# Patient Record
Sex: FEMALE | URBAN - METROPOLITAN AREA
[De-identification: names, ages, dates, MRNs, and addresses within clinical notes are randomized per-mention and may not be internally consistent; named-entity substitution may affect disease eponyms.]

---

## 2017-01-12 ENCOUNTER — EMERGENCY (EMERGENCY)
Facility: HOSPITAL | Age: 62
LOS: 1 days | Discharge: HOME | End: 2017-01-12
Attending: EMERGENCY MEDICINE | Admitting: EMERGENCY MEDICINE
Payer: COMMERCIAL

## 2017-01-12 VITALS
HEART RATE: 71 BPM | SYSTOLIC BLOOD PRESSURE: 150 MMHG | OXYGEN SATURATION: 100 % | DIASTOLIC BLOOD PRESSURE: 74 MMHG | RESPIRATION RATE: 18 BRPM | TEMPERATURE: 98 F

## 2017-01-12 VITALS
RESPIRATION RATE: 18 BRPM | DIASTOLIC BLOOD PRESSURE: 77 MMHG | OXYGEN SATURATION: 99 % | TEMPERATURE: 98 F | HEART RATE: 75 BPM | SYSTOLIC BLOOD PRESSURE: 144 MMHG

## 2017-01-12 LAB
ALBUMIN SERPL ELPH-MCNC: 3.9 G/DL — SIGNIFICANT CHANGE UP (ref 3.3–5)
ALP SERPL-CCNC: 94 U/L — SIGNIFICANT CHANGE UP (ref 40–120)
ALT FLD-CCNC: 21 U/L — SIGNIFICANT CHANGE UP (ref 4–33)
APPEARANCE UR: CLEAR — SIGNIFICANT CHANGE UP
AST SERPL-CCNC: 20 U/L — SIGNIFICANT CHANGE UP (ref 4–32)
BASOPHILS # BLD AUTO: 0.02 K/UL — SIGNIFICANT CHANGE UP (ref 0–0.2)
BASOPHILS NFR BLD AUTO: 0.2 % — SIGNIFICANT CHANGE UP (ref 0–2)
BILIRUB SERPL-MCNC: 0.4 MG/DL — SIGNIFICANT CHANGE UP (ref 0.2–1.2)
BILIRUB UR-MCNC: NEGATIVE — SIGNIFICANT CHANGE UP
BLOOD UR QL VISUAL: NEGATIVE — SIGNIFICANT CHANGE UP
BUN SERPL-MCNC: 17 MG/DL — SIGNIFICANT CHANGE UP (ref 7–23)
CALCIUM SERPL-MCNC: 9.8 MG/DL — SIGNIFICANT CHANGE UP (ref 8.4–10.5)
CHLORIDE SERPL-SCNC: 92 MMOL/L — LOW (ref 98–107)
CO2 SERPL-SCNC: 28 MMOL/L — SIGNIFICANT CHANGE UP (ref 22–31)
COLOR SPEC: SIGNIFICANT CHANGE UP
CREAT SERPL-MCNC: 0.66 MG/DL — SIGNIFICANT CHANGE UP (ref 0.5–1.3)
EOSINOPHIL # BLD AUTO: 0.49 K/UL — SIGNIFICANT CHANGE UP (ref 0–0.5)
EOSINOPHIL NFR BLD AUTO: 4.8 % — SIGNIFICANT CHANGE UP (ref 0–6)
GLUCOSE SERPL-MCNC: 114 MG/DL — HIGH (ref 70–99)
GLUCOSE UR-MCNC: NEGATIVE — SIGNIFICANT CHANGE UP
HCT VFR BLD CALC: 33.9 % — LOW (ref 34.5–45)
HGB BLD-MCNC: 10.8 G/DL — LOW (ref 11.5–15.5)
IMM GRANULOCYTES NFR BLD AUTO: 0.2 % — SIGNIFICANT CHANGE UP (ref 0–1.5)
KETONES UR-MCNC: NEGATIVE — SIGNIFICANT CHANGE UP
LEUKOCYTE ESTERASE UR-ACNC: SIGNIFICANT CHANGE UP
LYMPHOCYTES # BLD AUTO: 4.51 K/UL — HIGH (ref 1–3.3)
LYMPHOCYTES # BLD AUTO: 44 % — SIGNIFICANT CHANGE UP (ref 13–44)
MCHC RBC-ENTMCNC: 25.9 PG — LOW (ref 27–34)
MCHC RBC-ENTMCNC: 31.9 % — LOW (ref 32–36)
MCV RBC AUTO: 81.3 FL — SIGNIFICANT CHANGE UP (ref 80–100)
MONOCYTES # BLD AUTO: 0.57 K/UL — SIGNIFICANT CHANGE UP (ref 0–0.9)
MONOCYTES NFR BLD AUTO: 5.6 % — SIGNIFICANT CHANGE UP (ref 2–14)
MUCOUS THREADS # UR AUTO: SIGNIFICANT CHANGE UP
NEUTROPHILS # BLD AUTO: 4.64 K/UL — SIGNIFICANT CHANGE UP (ref 1.8–7.4)
NEUTROPHILS NFR BLD AUTO: 45.2 % — SIGNIFICANT CHANGE UP (ref 43–77)
NITRITE UR-MCNC: NEGATIVE — SIGNIFICANT CHANGE UP
PH UR: 6.5 — SIGNIFICANT CHANGE UP (ref 4.6–8)
PLATELET # BLD AUTO: 372 K/UL — SIGNIFICANT CHANGE UP (ref 150–400)
PMV BLD: 8.9 FL — SIGNIFICANT CHANGE UP (ref 7–13)
POTASSIUM SERPL-MCNC: 3.8 MMOL/L — SIGNIFICANT CHANGE UP (ref 3.5–5.3)
POTASSIUM SERPL-SCNC: 3.8 MMOL/L — SIGNIFICANT CHANGE UP (ref 3.5–5.3)
PROT SERPL-MCNC: 8.6 G/DL — HIGH (ref 6–8.3)
PROT UR-MCNC: NEGATIVE — SIGNIFICANT CHANGE UP
RBC # BLD: 4.17 M/UL — SIGNIFICANT CHANGE UP (ref 3.8–5.2)
RBC # FLD: 13.3 % — SIGNIFICANT CHANGE UP (ref 10.3–14.5)
SODIUM SERPL-SCNC: 134 MMOL/L — LOW (ref 135–145)
SP GR SPEC: 1.01 — SIGNIFICANT CHANGE UP (ref 1–1.03)
SQUAMOUS # UR AUTO: SIGNIFICANT CHANGE UP
UROBILINOGEN FLD QL: NORMAL E.U. — SIGNIFICANT CHANGE UP (ref 0.1–0.2)
WBC # BLD: 10.25 K/UL — SIGNIFICANT CHANGE UP (ref 3.8–10.5)
WBC # FLD AUTO: 10.25 K/UL — SIGNIFICANT CHANGE UP (ref 3.8–10.5)
WBC UR QL: SIGNIFICANT CHANGE UP (ref 0–?)

## 2017-01-12 PROCEDURE — 99284 EMERGENCY DEPT VISIT MOD MDM: CPT | Mod: 25

## 2017-01-12 PROCEDURE — 74176 CT ABD & PELVIS W/O CONTRAST: CPT | Mod: 26,GC

## 2017-01-12 PROCEDURE — 93010 ELECTROCARDIOGRAM REPORT: CPT

## 2017-01-12 NOTE — ED PROVIDER NOTE - PROGRESS NOTE DETAILS
Patient reevaluated and feeling better, denies any pain at the moment. Labs and CT within normal, symptoms most likely due to muscular pain. Patient will use Tylenol at home as she is a Diabetic and tries to use the less amount of NSAIDs as possible. Will give copies of the studies and discharge home.

## 2017-01-12 NOTE — ED PROVIDER NOTE - NS ED MD SCRIBE ATTENDING SCRIBE SECTIONS
PHYSICAL EXAM/HISTORY OF PRESENT ILLNESS/PAST MEDICAL/SURGICAL/SOCIAL HISTORY/VITAL SIGNS( Pullset)/DISPOSITION/HIV/REVIEW OF SYSTEMS

## 2017-01-12 NOTE — ED ADULT TRIAGE NOTE - CHIEF COMPLAINT QUOTE
Pt states had a fall in october fell on the right side since then pain on and off but yesterday developed worse pain. Pain increases with movement.

## 2017-01-12 NOTE — ED PROVIDER NOTE - PMH
CAD (coronary artery disease)    DM (diabetes mellitus)    HTN (hypertension)    Stented coronary artery

## 2017-01-12 NOTE — ED ADULT NURSE NOTE - OBJECTIVE STATEMENT
pt states she fell around Halloween and hit her rt flank. states she has been having pain in same area since. states last night pain was "killing me" . pain at present 6/10   evaluated by JESÚS Jaramillo. ct scan done.   sl placed labs sent. denies dysuria, hematuria.

## 2017-01-12 NOTE — ED PROVIDER NOTE - DETAILS:
I've seen and examined the patient and dictated the chart to the scribe, I agree with the documentation.

## 2017-01-12 NOTE — ED PROVIDER NOTE - OBJECTIVE STATEMENT
61  y F with PMH of DM, CAD with 3 stents, HTN, presents to the ED with Rt flank pain x yesterday. Pt states had a fall in 10/2016 fell on the right side. Went to [facility], XR and urine negative. Pt had swelling rx'd analgesia and antiinflammatories. Since then pain has been intermittent but yesterday developed worse pain. Pain increases with movement and cough. Notes Rt flank pain radiating to abd. Positive urinary frequency but no other urinary sx. Notes some nausea and constipation. No CP, no vomiting. Took 2 Gas-X to no relief.

## 2017-01-12 NOTE — ED PROVIDER NOTE - NS ED ATTENDING STATEMENT MOD
I have personally performed a face to face diagnostic evaluation on this patient. I have reviewed the PA note and agree with the history, exam, and plan of care, except as noted. I personally performed the services described in the documentation, reviewed the documentation recorded by the scribe in my presence and it accurately and completely records my words and action.

## 2017-10-03 ENCOUNTER — EMERGENCY (EMERGENCY)
Facility: HOSPITAL | Age: 62
LOS: 1 days | Discharge: HOME | End: 2017-10-03
Attending: EMERGENCY MEDICINE | Admitting: EMERGENCY MEDICINE
Payer: COMMERCIAL

## 2017-10-03 VITALS
SYSTOLIC BLOOD PRESSURE: 129 MMHG | DIASTOLIC BLOOD PRESSURE: 68 MMHG | RESPIRATION RATE: 18 BRPM | HEART RATE: 72 BPM | TEMPERATURE: 98 F | OXYGEN SATURATION: 100 %

## 2017-10-03 PROCEDURE — 99284 EMERGENCY DEPT VISIT MOD MDM: CPT

## 2017-10-03 PROCEDURE — 93971 EXTREMITY STUDY: CPT | Mod: 26,LT

## 2017-10-03 PROCEDURE — 73564 X-RAY EXAM KNEE 4 OR MORE: CPT | Mod: 26,RT

## 2017-10-03 RX ORDER — IBUPROFEN 200 MG
600 TABLET ORAL ONCE
Qty: 0 | Refills: 0 | Status: COMPLETED | OUTPATIENT
Start: 2017-10-03 | End: 2017-10-03

## 2017-10-03 RX ADMIN — Medication 600 MILLIGRAM(S): at 23:55

## 2017-10-03 NOTE — ED PROVIDER NOTE - MEDICAL DECISION MAKING DETAILS
62yF w/ pmhx HTN, DM, CAD s/p 3 stents presenting with right knee pain worse the past 3-4 days. Will get xray to r/o fracture and assess possible arthritic changes. Infectious process less likely given no swelling, erythema, or effusion

## 2017-10-03 NOTE — ED PROVIDER NOTE - ATTENDING CONTRIBUTION TO CARE
ED Attending Dr. Cabral: 61 yo female with CAD s/p stents, DM, HTN in ED with 3 days of atraumatic right knee pain.  Pt admits to somewhat chronic pain in the right knee but worse over the past 3 days, making walking difficult.  Pain worse to posterior knee.  No fever, CP/SOB, N/V/D or abdominal pain.  On exam pt well appearing, in NAD, heart RRR, lungs CTAB, abd NTND, strength 5/5 in all extremities and skin without rash.  Right knee mild generalized swelling anteriorly but no skin changes or erythema/cellulitis.  Nontender anterior right knee but posterior knee TTP, no bulging noted.  ROM right knee limited due to pain.

## 2017-10-03 NOTE — ED PROVIDER NOTE - CARE PLAN
Instructions for follow-up, activity and diet:	Follow up with your primary care provider within 48 hours  Follow up with an orthopedic doctor within 1 week  ACE wrap as needed for compression and support  Return to the emergency department with any concerning or worsening symptoms, increased pain or swelling, new onset fevers, weakness or any other concerning symptoms. Principal Discharge DX:	Knee pain  Instructions for follow-up, activity and diet:	Follow up with your primary care provider within 48 hours  Follow up with an orthopedic doctor within 1 week, referral list provided  ACE wrap as needed for compression and support  Use cane as needed for assistance with walking  Return to the emergency department with any concerning or worsening symptoms, increased pain or swelling, new onset fevers, weakness or any other concerning symptoms.

## 2017-10-03 NOTE — ED PROVIDER NOTE - PLAN OF CARE
Follow up with your primary care provider within 48 hours  Follow up with an orthopedic doctor within 1 week  ACE wrap as needed for compression and support  Return to the emergency department with any concerning or worsening symptoms, increased pain or swelling, new onset fevers, weakness or any other concerning symptoms. Follow up with your primary care provider within 48 hours  Follow up with an orthopedic doctor within 1 week, referral list provided  ACE wrap as needed for compression and support  Use cane as needed for assistance with walking  Return to the emergency department with any concerning or worsening symptoms, increased pain or swelling, new onset fevers, weakness or any other concerning symptoms.

## 2017-10-03 NOTE — ED ADULT TRIAGE NOTE - CHIEF COMPLAINT QUOTE
c/o rt knee pain x 4 days.  denies trauma.  denies fevers.   h/o DM, HTN, cardiac stents x 3  denies chest pain.

## 2017-10-03 NOTE — ED PROVIDER NOTE - OBJECTIVE STATEMENT
62yF w/ pmhx DM, HTN and CAD s/p 3 stents presenting with right knee pain x 3 days. Pt states she has had pain in her right knee since a fall years ago down stairs. Her pain is worsening the past 3 days, described as a stiffness, limited ROM and inability to bear weight today. Denies fevers/chills, 62yF w/ pmhx DM, HTN and CAD s/p 3 stents presenting with right knee pain x 3 days, denies trauma or injury. Pt states she has had pain in her right knee since a fall years ago down stairs. Her pain is worsening the past 3 days, described as a stiffness, pt feels her knee cracks when she walks, limited ROM and inability to bear weight today. Pt has been taking Tylenol at home for the pain with minimal relief. Two weeks ago patient received the flu shot and felt feverish for a few days after. Pt denies recent fever/chills, abdominal pain, dysuria, chest pain, shortness of breath or any other complaints

## 2017-10-03 NOTE — ED PROVIDER NOTE - PHYSICAL EXAMINATION
MSK: Right knee with ROM limited 2/2 pain, mildly tender to palpation in posterior knee and along the medial joint line, no signs of effusion, unable to fully assess knee given pain, all extremities neurovascularly intact

## 2017-10-04 PROBLEM — I25.10 ATHEROSCLEROTIC HEART DISEASE OF NATIVE CORONARY ARTERY WITHOUT ANGINA PECTORIS: Chronic | Status: ACTIVE | Noted: 2017-01-12

## 2017-10-04 PROBLEM — I10 ESSENTIAL (PRIMARY) HYPERTENSION: Chronic | Status: ACTIVE | Noted: 2017-01-12

## 2017-10-04 PROBLEM — Z95.5 PRESENCE OF CORONARY ANGIOPLASTY IMPLANT AND GRAFT: Chronic | Status: ACTIVE | Noted: 2017-01-12

## 2017-10-04 PROBLEM — E11.9 TYPE 2 DIABETES MELLITUS WITHOUT COMPLICATIONS: Chronic | Status: ACTIVE | Noted: 2017-01-12

## 2017-10-10 ENCOUNTER — INPATIENT (INPATIENT)
Facility: HOSPITAL | Age: 62
LOS: 1 days | Discharge: HOME | End: 2017-10-12
Attending: HOSPITALIST | Admitting: HOSPITALIST
Payer: COMMERCIAL

## 2017-10-10 VITALS
HEART RATE: 58 BPM | TEMPERATURE: 98 F | SYSTOLIC BLOOD PRESSURE: 170 MMHG | RESPIRATION RATE: 18 BRPM | DIASTOLIC BLOOD PRESSURE: 79 MMHG

## 2017-10-10 DIAGNOSIS — M79.606 PAIN IN LEG, UNSPECIFIED: ICD-10-CM

## 2017-10-10 LAB
ALBUMIN SERPL ELPH-MCNC: 3.8 G/DL — SIGNIFICANT CHANGE UP (ref 3.3–5)
ALP SERPL-CCNC: 84 U/L — SIGNIFICANT CHANGE UP (ref 40–120)
ALT FLD-CCNC: 23 U/L — SIGNIFICANT CHANGE UP (ref 4–33)
APPEARANCE UR: CLEAR — SIGNIFICANT CHANGE UP
AST SERPL-CCNC: 38 U/L — HIGH (ref 4–32)
BACTERIA # UR AUTO: SIGNIFICANT CHANGE UP
BASE EXCESS BLDV CALC-SCNC: 7 MMOL/L — SIGNIFICANT CHANGE UP
BASOPHILS # BLD AUTO: 0.03 K/UL — SIGNIFICANT CHANGE UP (ref 0–0.2)
BASOPHILS NFR BLD AUTO: 0.4 % — SIGNIFICANT CHANGE UP (ref 0–2)
BILIRUB SERPL-MCNC: 0.3 MG/DL — SIGNIFICANT CHANGE UP (ref 0.2–1.2)
BILIRUB UR-MCNC: NEGATIVE — SIGNIFICANT CHANGE UP
BLOOD GAS VENOUS - CREATININE: 0.55 MG/DL — SIGNIFICANT CHANGE UP (ref 0.5–1.3)
BLOOD UR QL VISUAL: NEGATIVE — SIGNIFICANT CHANGE UP
BUN SERPL-MCNC: 14 MG/DL — SIGNIFICANT CHANGE UP (ref 7–23)
CALCIUM SERPL-MCNC: 9.4 MG/DL — SIGNIFICANT CHANGE UP (ref 8.4–10.5)
CHLORIDE BLDV-SCNC: 100 MMOL/L — SIGNIFICANT CHANGE UP (ref 96–108)
CHLORIDE SERPL-SCNC: 94 MMOL/L — LOW (ref 98–107)
CO2 SERPL-SCNC: 26 MMOL/L — SIGNIFICANT CHANGE UP (ref 22–31)
COLOR SPEC: SIGNIFICANT CHANGE UP
CREAT SERPL-MCNC: 0.63 MG/DL — SIGNIFICANT CHANGE UP (ref 0.5–1.3)
EOSINOPHIL # BLD AUTO: 0.3 K/UL — SIGNIFICANT CHANGE UP (ref 0–0.5)
EOSINOPHIL NFR BLD AUTO: 3.9 % — SIGNIFICANT CHANGE UP (ref 0–6)
GAS PNL BLDV: 134 MMOL/L — LOW (ref 136–146)
GLUCOSE BLDV-MCNC: 103 — HIGH (ref 70–99)
GLUCOSE SERPL-MCNC: 98 MG/DL — SIGNIFICANT CHANGE UP (ref 70–99)
GLUCOSE UR-MCNC: NEGATIVE — SIGNIFICANT CHANGE UP
HCO3 BLDV-SCNC: 29 MMOL/L — HIGH (ref 20–27)
HCT VFR BLD CALC: 33.3 % — LOW (ref 34.5–45)
HCT VFR BLDV CALC: 33.7 % — LOW (ref 34.5–45)
HGB BLD-MCNC: 10.6 G/DL — LOW (ref 11.5–15.5)
HGB BLDV-MCNC: 10.9 G/DL — LOW (ref 11.5–15.5)
IMM GRANULOCYTES # BLD AUTO: 0.01 # — SIGNIFICANT CHANGE UP
IMM GRANULOCYTES NFR BLD AUTO: 0.1 % — SIGNIFICANT CHANGE UP (ref 0–1.5)
KETONES UR-MCNC: NEGATIVE — SIGNIFICANT CHANGE UP
LACTATE BLDV-MCNC: 0.9 MMOL/L — SIGNIFICANT CHANGE UP (ref 0.5–2)
LEUKOCYTE ESTERASE UR-ACNC: NEGATIVE — SIGNIFICANT CHANGE UP
LIDOCAIN IGE QN: 32.7 U/L — SIGNIFICANT CHANGE UP (ref 7–60)
LYMPHOCYTES # BLD AUTO: 3.19 K/UL — SIGNIFICANT CHANGE UP (ref 1–3.3)
LYMPHOCYTES # BLD AUTO: 41.4 % — SIGNIFICANT CHANGE UP (ref 13–44)
MCHC RBC-ENTMCNC: 25.6 PG — LOW (ref 27–34)
MCHC RBC-ENTMCNC: 31.8 % — LOW (ref 32–36)
MCV RBC AUTO: 80.4 FL — SIGNIFICANT CHANGE UP (ref 80–100)
MONOCYTES # BLD AUTO: 0.73 K/UL — SIGNIFICANT CHANGE UP (ref 0–0.9)
MONOCYTES NFR BLD AUTO: 9.5 % — SIGNIFICANT CHANGE UP (ref 2–14)
NEUTROPHILS # BLD AUTO: 3.44 K/UL — SIGNIFICANT CHANGE UP (ref 1.8–7.4)
NEUTROPHILS NFR BLD AUTO: 44.7 % — SIGNIFICANT CHANGE UP (ref 43–77)
NITRITE UR-MCNC: NEGATIVE — SIGNIFICANT CHANGE UP
NRBC # FLD: 0 — SIGNIFICANT CHANGE UP
OB PNL STL: NEGATIVE — SIGNIFICANT CHANGE UP
PCO2 BLDV: 56 MMHG — HIGH (ref 41–51)
PH BLDV: 7.38 PH — SIGNIFICANT CHANGE UP (ref 7.32–7.43)
PH UR: 7 — SIGNIFICANT CHANGE UP (ref 4.6–8)
PLATELET # BLD AUTO: 395 K/UL — SIGNIFICANT CHANGE UP (ref 150–400)
PMV BLD: 8.9 FL — SIGNIFICANT CHANGE UP (ref 7–13)
PO2 BLDV: 30 MMHG — LOW (ref 35–40)
POTASSIUM BLDV-SCNC: 3.1 MMOL/L — LOW (ref 3.4–4.5)
POTASSIUM SERPL-MCNC: 4.4 MMOL/L — SIGNIFICANT CHANGE UP (ref 3.5–5.3)
POTASSIUM SERPL-SCNC: 4.4 MMOL/L — SIGNIFICANT CHANGE UP (ref 3.5–5.3)
PROT SERPL-MCNC: 8.7 G/DL — HIGH (ref 6–8.3)
PROT UR-MCNC: NEGATIVE — SIGNIFICANT CHANGE UP
RBC # BLD: 4.14 M/UL — SIGNIFICANT CHANGE UP (ref 3.8–5.2)
RBC # FLD: 13.8 % — SIGNIFICANT CHANGE UP (ref 10.3–14.5)
RBC CASTS # UR COMP ASSIST: SIGNIFICANT CHANGE UP (ref 0–?)
SAO2 % BLDV: 49 % — LOW (ref 60–85)
SODIUM SERPL-SCNC: 137 MMOL/L — SIGNIFICANT CHANGE UP (ref 135–145)
SP GR SPEC: 1.01 — SIGNIFICANT CHANGE UP (ref 1–1.03)
SQUAMOUS # UR AUTO: SIGNIFICANT CHANGE UP
UROBILINOGEN FLD QL: NORMAL E.U. — SIGNIFICANT CHANGE UP (ref 0.1–0.2)
WBC # BLD: 7.7 K/UL — SIGNIFICANT CHANGE UP (ref 3.8–10.5)
WBC # FLD AUTO: 7.7 K/UL — SIGNIFICANT CHANGE UP (ref 3.8–10.5)
WBC UR QL: SIGNIFICANT CHANGE UP (ref 0–?)

## 2017-10-10 PROCEDURE — 74177 CT ABD & PELVIS W/CONTRAST: CPT | Mod: 26

## 2017-10-10 RX ORDER — ASPIRIN/CALCIUM CARB/MAGNESIUM 324 MG
1 TABLET ORAL
Qty: 0 | Refills: 0 | COMMUNITY

## 2017-10-10 RX ORDER — OXYCODONE AND ACETAMINOPHEN 5; 325 MG/1; MG/1
1 TABLET ORAL ONCE
Qty: 0 | Refills: 0 | Status: DISCONTINUED | OUTPATIENT
Start: 2017-10-10 | End: 2017-10-10

## 2017-10-10 RX ORDER — ATENOLOL 25 MG/1
1 TABLET ORAL
Qty: 0 | Refills: 0 | COMMUNITY

## 2017-10-10 RX ORDER — SODIUM CHLORIDE 9 MG/ML
1000 INJECTION INTRAMUSCULAR; INTRAVENOUS; SUBCUTANEOUS ONCE
Qty: 0 | Refills: 0 | Status: COMPLETED | OUTPATIENT
Start: 2017-10-10 | End: 2017-10-10

## 2017-10-10 RX ORDER — METFORMIN HYDROCHLORIDE 850 MG/1
1 TABLET ORAL
Qty: 0 | Refills: 0 | COMMUNITY

## 2017-10-10 RX ORDER — IBUPROFEN 200 MG
600 TABLET ORAL ONCE
Qty: 0 | Refills: 0 | Status: COMPLETED | OUTPATIENT
Start: 2017-10-10 | End: 2017-10-10

## 2017-10-10 RX ORDER — AMLODIPINE BESYLATE 2.5 MG/1
1 TABLET ORAL
Qty: 0 | Refills: 0 | COMMUNITY

## 2017-10-10 RX ORDER — LOSARTAN/HYDROCHLOROTHIAZIDE 100MG-25MG
1 TABLET ORAL
Qty: 0 | Refills: 0 | COMMUNITY

## 2017-10-10 RX ORDER — ROSUVASTATIN CALCIUM 5 MG/1
1 TABLET ORAL
Qty: 0 | Refills: 0 | COMMUNITY

## 2017-10-10 RX ORDER — CLOPIDOGREL BISULFATE 75 MG/1
1 TABLET, FILM COATED ORAL
Qty: 0 | Refills: 0 | COMMUNITY

## 2017-10-10 RX ORDER — GLIMEPIRIDE 1 MG
1 TABLET ORAL
Qty: 0 | Refills: 0 | COMMUNITY

## 2017-10-10 RX ADMIN — OXYCODONE AND ACETAMINOPHEN 1 TABLET(S): 5; 325 TABLET ORAL at 21:34

## 2017-10-10 RX ADMIN — Medication 600 MILLIGRAM(S): at 14:35

## 2017-10-10 RX ADMIN — SODIUM CHLORIDE 1000 MILLILITER(S): 9 INJECTION INTRAMUSCULAR; INTRAVENOUS; SUBCUTANEOUS at 14:14

## 2017-10-10 RX ADMIN — OXYCODONE AND ACETAMINOPHEN 1 TABLET(S): 5; 325 TABLET ORAL at 22:26

## 2017-10-10 RX ADMIN — Medication 600 MILLIGRAM(S): at 21:34

## 2017-10-10 NOTE — ED ADULT TRIAGE NOTE - CHIEF COMPLAINT QUOTE
Pt had 3 stints placed in January and rt groin area was incision area--pt c/o swelling in groin and leg-pt cant put pressure on leg--pt seen here on tuesday night and had sonogram--nothing found--  pt cant take pain

## 2017-10-10 NOTE — ED ADULT NURSE NOTE - OBJECTIVE STATEMENT
pt received to intake room 7, aox3.  pt c/o right inner thigh pain and swelling x a few days.  pt reports intermittent pain, deneis n/v/d and abd pain.  minor swelling noted to area.  SL placed, labs sent, PA at bedside.  awaiting further orders will monitor

## 2017-10-10 NOTE — ED PROVIDER NOTE - PHYSICAL EXAMINATION
no groin swelling or pain with palpation. No leg pain with palpation. Good ROM flex /ext knee.  Ng hip rotation.  Sensation fully intact and equal bl. pulses good 2+ asymmetrical bl.

## 2017-10-10 NOTE — ED PROVIDER NOTE - RECTAL
no masses palpated,  brown colored stool. no masses palpated,  brown colored stoo, good rectal tonel.

## 2017-10-10 NOTE — ED PROVIDER NOTE - PROGRESS NOTE DETAILS
Keegan: pt signed out to me, right groin pain x 2 weeks, was pending ct. ct neg. pt with still complaints of pain down entire right leg, decreased sensation throughout. good pulses and motor. had cath in january without symptoms then, neg venous doppler last week and no back pain currently. given inability to walk and possible need for further imaging (angio of leg, mri back) will admit. txt paged MAR RE admission

## 2017-10-10 NOTE — ED PROVIDER NOTE - OBJECTIVE STATEMENT
63 yo female, pmh diabetes (po meds), htn, cad w/ stents * 3 (last 2016 January), presents to the ED co Right groin pain /leg swelling for about 10 days now, which is causing difficulty weight bearing.  Seen in the ED 1 week ago for the same complaint, ng doppler and x-ray knee but symptoms have been getting worse.  Denies fevers, chills, nausea, vomiting, abdominal pains, constipation, diarrhea. States has hx hemorrhoids with blood occasionally in the stool.  Last BM today, which was normal.   Taking Motrin and Tylenol with no significant relief.

## 2017-10-10 NOTE — ED PROVIDER NOTE - ATTENDING CONTRIBUTION TO CARE
dr dover Dr. Mullen: I performed a face to face bedside interview with patient regarding history of present illness, review of symptoms and past medical history. I completed an independent physical exam.  I have discussed patient's plan of care with PA.   I agree with note as stated above, having amended the EMR as needed to reflect my findings.   This includes HISTORY OF PRESENT ILLNESS, HIV, PAST MEDICAL/SURGICAL/FAMILY/SOCIAL HISTORY, ALLERGIES AND HOME MEDICATIONS, REVIEW OF SYSTEMS, PHYSICAL EXAM, and any PROGRESS NOTES during the time I functioned as the attending physician for this patient.  62F h/o DM, HTN, CAD s/p stents x 3 (last one in Jan 2016) p/w R groin pain radiating to the R leg x 10d. Seen in the ED for this last week and had neg duplex. Pain continues so pt returns. Took Tylenol and Motrin with some relief.. No nausea, vomiting, fevers, chills, constipation or diarrhea.  On exam pt appears well, nad, rrr, ctab, abdo soft with RLQ and suprapubic Dr. Mullen: I performed a face to face bedside interview with patient regarding history of present illness, review of symptoms and past medical history. I completed an independent physical exam.  I have discussed patient's plan of care with PA.   I agree with note as stated above, having amended the EMR as needed to reflect my findings.   This includes HISTORY OF PRESENT ILLNESS, HIV, PAST MEDICAL/SURGICAL/FAMILY/SOCIAL HISTORY, ALLERGIES AND HOME MEDICATIONS, REVIEW OF SYSTEMS, PHYSICAL EXAM, and any PROGRESS NOTES during the time I functioned as the attending physician for this patient.  62F h/o DM, HTN, CAD s/p stents x 3 (last one in Jan 2016) p/w R groin pain radiating to the R leg x 10d. Seen in the ED for this last week and had neg duplex. Pain continues so pt returns. Took Tylenol and Motrin with some relief.. No nausea, vomiting, fevers, chills, constipation or diarrhea.  On exam pt appears well, nad, rrr, ctab, abdo soft with RLQ and suprapubic ttp, no cvat, no hernias, no leg ttp  Plan - pain ctrl, labs, ct a/p, reassess

## 2017-10-11 DIAGNOSIS — Z90.49 ACQUIRED ABSENCE OF OTHER SPECIFIED PARTS OF DIGESTIVE TRACT: Chronic | ICD-10-CM

## 2017-10-11 DIAGNOSIS — Z90.710 ACQUIRED ABSENCE OF BOTH CERVIX AND UTERUS: Chronic | ICD-10-CM

## 2017-10-11 DIAGNOSIS — M79.604 PAIN IN RIGHT LEG: ICD-10-CM

## 2017-10-11 DIAGNOSIS — M79.651 PAIN IN RIGHT THIGH: ICD-10-CM

## 2017-10-11 DIAGNOSIS — E11.9 TYPE 2 DIABETES MELLITUS WITHOUT COMPLICATIONS: ICD-10-CM

## 2017-10-11 DIAGNOSIS — R63.8 OTHER SYMPTOMS AND SIGNS CONCERNING FOOD AND FLUID INTAKE: ICD-10-CM

## 2017-10-11 DIAGNOSIS — I10 ESSENTIAL (PRIMARY) HYPERTENSION: ICD-10-CM

## 2017-10-11 DIAGNOSIS — Z29.9 ENCOUNTER FOR PROPHYLACTIC MEASURES, UNSPECIFIED: ICD-10-CM

## 2017-10-11 DIAGNOSIS — Z95.5 PRESENCE OF CORONARY ANGIOPLASTY IMPLANT AND GRAFT: ICD-10-CM

## 2017-10-11 LAB
BUN SERPL-MCNC: 11 MG/DL — SIGNIFICANT CHANGE UP (ref 7–23)
CALCIUM SERPL-MCNC: 9.7 MG/DL — SIGNIFICANT CHANGE UP (ref 8.4–10.5)
CHLORIDE SERPL-SCNC: 98 MMOL/L — SIGNIFICANT CHANGE UP (ref 98–107)
CO2 SERPL-SCNC: 26 MMOL/L — SIGNIFICANT CHANGE UP (ref 22–31)
CREAT SERPL-MCNC: 0.68 MG/DL — SIGNIFICANT CHANGE UP (ref 0.5–1.3)
CRP SERPL-MCNC: 11.1 MG/L — HIGH
ERYTHROCYTE [SEDIMENTATION RATE] IN BLOOD: 85 MM/HR — HIGH (ref 4–25)
FOLATE SERPL-MCNC: 18 NG/ML — SIGNIFICANT CHANGE UP (ref 4.7–20)
GLUCOSE SERPL-MCNC: 108 MG/DL — HIGH (ref 70–99)
HAV IGM SER-ACNC: NONREACTIVE — SIGNIFICANT CHANGE UP
HBA1C BLD-MCNC: 7.7 % — HIGH (ref 4–5.6)
HBV CORE IGM SER-ACNC: NONREACTIVE — SIGNIFICANT CHANGE UP
HBV SURFACE AG SER-ACNC: NONREACTIVE — SIGNIFICANT CHANGE UP
HCV AB S/CO SERPL IA: 0.59 S/CO — SIGNIFICANT CHANGE UP
HCV AB SERPL-IMP: SIGNIFICANT CHANGE UP
HIV 1+2 AB+HIV1 P24 AG SERPL QL IA: SIGNIFICANT CHANGE UP
IRON SATN MFR SERPL: 318 UG/DL — SIGNIFICANT CHANGE UP (ref 140–530)
IRON SATN MFR SERPL: 56 UG/DL — SIGNIFICANT CHANGE UP (ref 30–160)
MAGNESIUM SERPL-MCNC: 2 MG/DL — SIGNIFICANT CHANGE UP (ref 1.6–2.6)
PHOSPHATE SERPL-MCNC: 3.7 MG/DL — SIGNIFICANT CHANGE UP (ref 2.5–4.5)
POTASSIUM SERPL-MCNC: 3.6 MMOL/L — SIGNIFICANT CHANGE UP (ref 3.5–5.3)
POTASSIUM SERPL-SCNC: 3.6 MMOL/L — SIGNIFICANT CHANGE UP (ref 3.5–5.3)
SODIUM SERPL-SCNC: 139 MMOL/L — SIGNIFICANT CHANGE UP (ref 135–145)
TSH SERPL-MCNC: 2.87 UIU/ML — SIGNIFICANT CHANGE UP (ref 0.27–4.2)
UIBC SERPL-MCNC: 262 UG/DL — SIGNIFICANT CHANGE UP (ref 110–370)
VIT B12 SERPL-MCNC: 782 PG/ML — SIGNIFICANT CHANGE UP (ref 200–900)

## 2017-10-11 PROCEDURE — 73502 X-RAY EXAM HIP UNI 2-3 VIEWS: CPT | Mod: 26,RT

## 2017-10-11 PROCEDURE — 99223 1ST HOSP IP/OBS HIGH 75: CPT | Mod: GC

## 2017-10-11 PROCEDURE — 12345: CPT | Mod: GC,NC

## 2017-10-11 RX ORDER — LOSARTAN POTASSIUM 100 MG/1
100 TABLET, FILM COATED ORAL DAILY
Qty: 0 | Refills: 0 | Status: DISCONTINUED | OUTPATIENT
Start: 2017-10-11 | End: 2017-10-12

## 2017-10-11 RX ORDER — DEXTROSE 50 % IN WATER 50 %
25 SYRINGE (ML) INTRAVENOUS ONCE
Qty: 0 | Refills: 0 | Status: DISCONTINUED | OUTPATIENT
Start: 2017-10-11 | End: 2017-10-12

## 2017-10-11 RX ORDER — DEXTROSE 50 % IN WATER 50 %
1 SYRINGE (ML) INTRAVENOUS ONCE
Qty: 0 | Refills: 0 | Status: DISCONTINUED | OUTPATIENT
Start: 2017-10-11 | End: 2017-10-12

## 2017-10-11 RX ORDER — ATENOLOL 25 MG/1
25 TABLET ORAL DAILY
Qty: 0 | Refills: 0 | Status: DISCONTINUED | OUTPATIENT
Start: 2017-10-11 | End: 2017-10-12

## 2017-10-11 RX ORDER — GLUCAGON INJECTION, SOLUTION 0.5 MG/.1ML
1 INJECTION, SOLUTION SUBCUTANEOUS ONCE
Qty: 0 | Refills: 0 | Status: DISCONTINUED | OUTPATIENT
Start: 2017-10-11 | End: 2017-10-12

## 2017-10-11 RX ORDER — ACETAMINOPHEN 500 MG
650 TABLET ORAL EVERY 6 HOURS
Qty: 0 | Refills: 0 | Status: DISCONTINUED | OUTPATIENT
Start: 2017-10-11 | End: 2017-10-12

## 2017-10-11 RX ORDER — INSULIN LISPRO 100/ML
VIAL (ML) SUBCUTANEOUS
Qty: 0 | Refills: 0 | Status: DISCONTINUED | OUTPATIENT
Start: 2017-10-11 | End: 2017-10-12

## 2017-10-11 RX ORDER — SODIUM CHLORIDE 9 MG/ML
1000 INJECTION, SOLUTION INTRAVENOUS
Qty: 0 | Refills: 0 | Status: DISCONTINUED | OUTPATIENT
Start: 2017-10-11 | End: 2017-10-12

## 2017-10-11 RX ORDER — CLOPIDOGREL BISULFATE 75 MG/1
75 TABLET, FILM COATED ORAL DAILY
Qty: 0 | Refills: 0 | Status: DISCONTINUED | OUTPATIENT
Start: 2017-10-11 | End: 2017-10-12

## 2017-10-11 RX ORDER — POLYETHYLENE GLYCOL 3350 17 G/17G
1 POWDER, FOR SOLUTION ORAL
Qty: 0 | Refills: 0 | COMMUNITY

## 2017-10-11 RX ORDER — OXYCODONE AND ACETAMINOPHEN 5; 325 MG/1; MG/1
1 TABLET ORAL EVERY 6 HOURS
Qty: 0 | Refills: 0 | Status: DISCONTINUED | OUTPATIENT
Start: 2017-10-11 | End: 2017-10-12

## 2017-10-11 RX ORDER — ASPIRIN/CALCIUM CARB/MAGNESIUM 324 MG
81 TABLET ORAL DAILY
Qty: 0 | Refills: 0 | Status: DISCONTINUED | OUTPATIENT
Start: 2017-10-11 | End: 2017-10-12

## 2017-10-11 RX ORDER — ENOXAPARIN SODIUM 100 MG/ML
40 INJECTION SUBCUTANEOUS DAILY
Qty: 0 | Refills: 0 | Status: DISCONTINUED | OUTPATIENT
Start: 2017-10-11 | End: 2017-10-12

## 2017-10-11 RX ORDER — ATORVASTATIN CALCIUM 80 MG/1
40 TABLET, FILM COATED ORAL AT BEDTIME
Qty: 0 | Refills: 0 | Status: DISCONTINUED | OUTPATIENT
Start: 2017-10-11 | End: 2017-10-12

## 2017-10-11 RX ORDER — POLYETHYLENE GLYCOL 3350 17 G/17G
17 POWDER, FOR SOLUTION ORAL
Qty: 0 | Refills: 0 | Status: DISCONTINUED | OUTPATIENT
Start: 2017-10-11 | End: 2017-10-12

## 2017-10-11 RX ORDER — AMLODIPINE BESYLATE 2.5 MG/1
5 TABLET ORAL DAILY
Qty: 0 | Refills: 0 | Status: DISCONTINUED | OUTPATIENT
Start: 2017-10-11 | End: 2017-10-12

## 2017-10-11 RX ORDER — HYDROCHLOROTHIAZIDE 25 MG
25 TABLET ORAL DAILY
Qty: 0 | Refills: 0 | Status: DISCONTINUED | OUTPATIENT
Start: 2017-10-11 | End: 2017-10-12

## 2017-10-11 RX ORDER — INSULIN LISPRO 100/ML
VIAL (ML) SUBCUTANEOUS AT BEDTIME
Qty: 0 | Refills: 0 | Status: DISCONTINUED | OUTPATIENT
Start: 2017-10-11 | End: 2017-10-12

## 2017-10-11 RX ORDER — DEXTROSE 50 % IN WATER 50 %
12.5 SYRINGE (ML) INTRAVENOUS ONCE
Qty: 0 | Refills: 0 | Status: DISCONTINUED | OUTPATIENT
Start: 2017-10-11 | End: 2017-10-12

## 2017-10-11 RX ADMIN — ENOXAPARIN SODIUM 40 MILLIGRAM(S): 100 INJECTION SUBCUTANEOUS at 13:22

## 2017-10-11 RX ADMIN — LOSARTAN POTASSIUM 100 MILLIGRAM(S): 100 TABLET, FILM COATED ORAL at 05:53

## 2017-10-11 RX ADMIN — Medication 650 MILLIGRAM(S): at 22:48

## 2017-10-11 RX ADMIN — ATORVASTATIN CALCIUM 40 MILLIGRAM(S): 80 TABLET, FILM COATED ORAL at 21:22

## 2017-10-11 RX ADMIN — CLOPIDOGREL BISULFATE 75 MILLIGRAM(S): 75 TABLET, FILM COATED ORAL at 13:22

## 2017-10-11 RX ADMIN — AMLODIPINE BESYLATE 5 MILLIGRAM(S): 2.5 TABLET ORAL at 05:54

## 2017-10-11 RX ADMIN — Medication 81 MILLIGRAM(S): at 13:22

## 2017-10-11 RX ADMIN — Medication: at 18:21

## 2017-10-11 RX ADMIN — Medication 25 MILLIGRAM(S): at 05:54

## 2017-10-11 RX ADMIN — Medication 650 MILLIGRAM(S): at 23:40

## 2017-10-11 RX ADMIN — Medication 2: at 13:22

## 2017-10-11 NOTE — DISCHARGE NOTE ADULT - CONDITIONS AT DISCHARGE
This Patient is stable for discharge , alert and oriented x4;skin's fine vital signs are stable also , Xrays were taken prior to being discharged.  Instructions are discussed with her and provided This Patient is stable for discharge , alert and oriented x4 ;skin's fine vital signs are stable also , Xrays were taken prior to being discharged.   she does have some left lower extremity weakness. Instructions are discussed with her and provided

## 2017-10-11 NOTE — PHYSICAL THERAPY INITIAL EVALUATION ADULT - LIVES WITH, PROFILE
other relative/(daughter, grandson), private home, 5 steps-to-enter/exit home with unilateral handrail/spouse/children

## 2017-10-11 NOTE — PHYSICAL THERAPY INITIAL EVALUATION ADULT - GAIT DEVIATIONS NOTED, PT EVAL
increased time in double stance/decreased stride length/decreased weight-shifting ability/decreased jeremy

## 2017-10-11 NOTE — PATIENT PROFILE ADULT. - NUTRITION PROFILE
Completed forms faxed back to [facility] at [phone].   Originals sent to be scanned.     Kathryn Carson          no indicators present

## 2017-10-11 NOTE — DISCHARGE NOTE ADULT - HOSPITAL COURSE
61 y/o F PMHx DM2, HTN, CAD s/p stents x3 (last 2016 January), presents with 1 week hx of R groin pain and R leg swelling 61 y/o F PMHx DM2, HTN, CAD s/p stents x3 (last 2016 January), presents with 1 week hx of R groin pain and R leg swelling. Patient was given tylenol PRN for moderate pain and percocet for severe pain, however patient only required 1 dose of percocet in the ED. Patient had a X-ray of the hip which showed osteoarthritic change but no signs of fracture, abscess, or AVN. Patient was seen by the physical therapist and was recommended on outpatient physical therapy and was prescribed rolling walker to go home. Patient was deem medically stable to be discharge home with instruction to take tylenol as needed for the pain, and go for physical therapy. 61 y/o F PMHx DM2, HTN, CAD s/p stents x3 (last 2016 January), presents with 1 week hx of R groin pain and R leg swelling. Patient was given tylenol PRN for moderate pain and percocet for severe pain, however patient only required 1 dose of percocet in the ED. Patient had a X-ray of the hip which showed osteoarthritic change but no signs of fracture, abscess, or AVN. Patient was seen by the physical therapist and was recommended on outpatient physical therapy and was prescribed rolling walker to go home. Patient was deem medically stable to be discharge home with instruction to take tylenol as needed for the pain, and go for physical therapy.     Patient's glucose has been under well control. her home diabetes medications was held and was started on ISS. She was continued on her home BP medications and CAD medications.

## 2017-10-11 NOTE — H&P ADULT - ASSESSMENT
61 y/o F PMHx DM2, HTN, CAD s/p stents x3 (last 2016 January), presents with R groin pain and R leg swelling x1 week.

## 2017-10-11 NOTE — H&P ADULT - HISTORY OF PRESENT ILLNESS
63 y/o F PMHx DM2, HTN, CAD s/p stents x3 (last 2016 January), presents with R groin pain and R leg swelling x10 days.     In ED,   Vitals T 97.9, HR 58, /79, RR 18, SpO2 100 on RA   Gave 1L NS, ibuprofen 600mg, percocet x1 63 y/o F PMHx DM2, HTN, CAD s/p stents x3 (last 2016 January), presents with R groin pain and R leg swelling x1 week.   Patient reports waking up a week ago and having R groin pain with ambulation as well as stiffness in her R leg as well.   She can't describe the pain, but says its severe and is 10/10 when she puts weight on it. It also hurts when she moves the leg when lying down, but doesn't have pain if she doesn't move it.   She also says her R leg has swollen over the last week as well. She reports the leg pain starts in her medial groin area and radiates down to the back of her R knee. Occasionally she also has stiffness in her R knee as well.   She also has a "calcification" on her R achilles tendon that causes her pain, but she has had that for months and she is scheduled for surgery on 11/10 to have it removed. She had the same surgery in her L heel earlier this year and has no pain in that heel anymore.   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   Patient reports waking up a week ago and having R groin pain with ambulation as well as stiffness in her R leg as well.   She can't describe the pain, but says its severe and is 10/10 when she puts weight on it. It also hurts when she moves the leg when lying down, but doesn't have pain if she doesn't move it.   She also says her R leg has swollen over the last week as well. She reports the leg pain starts in her medial groin area and radiates down to the back of her R knee. Occasionally she also has stiffness in her R knee or sometimes it radiates to the lateral part of her thigh as well.   She also has a "calcification" on her R achilles tendon that causes her pain, but she has had that for months and she is scheduled for surgery on 11/10 to have it removed. She had the same surgery in her L heel earlier this year and has no pain in that heel anymore.   Patient initially presented to the ED the day the pain began last week and US showed no DVT and XR showed some arthritis in the R hip, but was otherwise unremarkable She was sent home and instructed to take Motrin for the pain. The pain has worsened and the motrin and tylenol have not helped with her pain.   Denies any F/C, N/V, abdominal pain, back pain, dysuria, constipation, diarrhea, trauma or recent falls.   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 After standing for 15 to 20 mins patient reports the pain becomes unbearable and has limited her ability to work.      In ED,   Vitals T 97.9, HR 58, /79, RR 18, SpO2 100 on RA   Gave 1L NS, ibuprofen 600mg, percocet x1

## 2017-10-11 NOTE — DISCHARGE NOTE ADULT - CARE PLAN
Principal Discharge DX:	Right thigh pain  Goal:	Physical Therapy, pain free  Instructions for follow-up, activity and diet:	You came in with right thigh/hip pain. You had X-ray of the foot which showed osteoarthritis of the hip. You were given pain medication to control your pain. Please continue to take tylenol for the pain. Please go to physical therapy 3-5 times/week for 2 weeks. Please use walker to ambulate to prevent fall. Please follow up with your primary care physician in 1-2 weeks.  Secondary Diagnosis:	DM (diabetes mellitus)  Goal:	HA1c < 6.5%, glucose control,  prevention of complications  Instructions for follow-up, activity and diet:	Please continue to take your home medication. Please monitor your glucose level 3 times/day before meal. Please restrict sugar/cholesterol intake in your diet. Please exercise 3-5 times/week for 30 minutes each time. Please follow up with your primary care physician in 1-2 weeks  Secondary Diagnosis:	Essential hypertension  Goal:	Blood pressure < 150/90  Instructions for follow-up, activity and diet:	Please continue to take your home medications. Please restrict salt intake in your diet and follow up with your primary care physician in 1- 2 weeks.

## 2017-10-11 NOTE — H&P ADULT - ATTENDING COMMENTS
61 y/o female HX of DM, HTN, CAD, Stent on ASA/ Plavix, Anemia, OA, C/O Rt  groin/ leg swelling x one week, no trauma,  no fever, CT A/P unremarkable, venous Doppler Rt Leg no DVT last week,     Pt consult, Percocet, Colace, senna, Fall precaution, DVT Prophylaxis: SQ Lovenox, Hep A, B, C profile, iron studies, Vit B12, Folate , Norvasc, Atenolol, Lipitor, CPK, ESR, CRP, CBC, HgbA1c, TSH, FS, sliding scale, Hold Metformin ,     Case D/W Pt, HS    Pt was seen & examined by me, Dr. SOPHIE Drew on 10/11/17.

## 2017-10-11 NOTE — H&P ADULT - NEGATIVE ENMT SYMPTOMS
no post-nasal discharge/no hearing difficulty/no sinus symptoms/no nasal congestion/no tinnitus/no ear pain/no nasal discharge

## 2017-10-11 NOTE — H&P ADULT - NSHPSOCIALHISTORY_GEN_ALL_CORE
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

## 2017-10-11 NOTE — PROGRESS NOTE ADULT - SUBJECTIVE AND OBJECTIVE BOX
[facility] Medicine [facility] Progress Note- PGY1.    ===============================================================  CONTACT INFO   Luis F Adler M.D., PGY-1  Pager: NS- [phone], [facility]- [phone]  Spectra: [phone]    Mon-Fri: pager covered by day team 7am-7pm;   ***Academic conferences M-F 12pm-1pm- page ONLY if URGENT or if Consultant  Sat/Kendall Cross Coverage 12pm-7pm: NS- page [phone] for Team1-4, [facility]- pager forwarded to covering Resident  For Night coverage 7pm-7am:  [phone](NS)/[phone]([facility]) Team1-3, [phone] (NS)/[phone] ([facility]) Team4 & Care Model,  ===============================================================  CC: Patient is a 62y old  Female who presents with a chief complaint of R groin pain (11 Oct 2017 00:56)      HPI:  63 y/o F PMHx DM2, HTN, CAD s/p stents x3 (last 2016 January), presents with R groin pain and R leg swelling x1 week.   Patient reports waking up a week ago and having R groin pain with ambulation as well as stiffness in her R leg as well.   She can't describe the pain, but says its severe and is 10/10 when she puts weight on it. It also hurts when she moves the leg when lying down, but doesn't have pain if she doesn't move it.   She also says her R leg has swollen over the last week as well. She reports the leg pain starts in her medial groin area and radiates down to the back of her R knee. Occasionally she also has stiffness in her R knee or sometimes it radiates to the lateral part of her thigh as well.   She also has a "calcification" on her R achilles tendon that causes her pain, but she has had that for months and she is scheduled for surgery on 11/10 to have it removed. She had the same surgery in her L heel earlier this year and has no pain in that heel anymore.   Patient initially presented to the ED the day the pain began last week and US showed no DVT and XR showed some arthritis in the R hip, but was otherwise unremarkable She was sent home and instructed to take Motrin for the pain. The pain has worsened and the motrin and tylenol have not helped with her pain.   Denies any F/C, N/V, abdominal pain, back pain, dysuria, constipation, diarrhea, trauma or recent falls.   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 After standing for 15 to 20 mins patient reports the pain becomes unbearable and has limited her ability to work.      In ED,   Vitals T 97.9, HR 58, /79, RR 18, SpO2 100 on RA   Gave 1L NS, ibuprofen 600mg, percocet x1 (11 Oct 2017 00:56)      Seen and examine patient at bedside. Patient reports feeling . Denied fever, chill, n/v/d, CP, SOB, or abdominal pain.       Allergies    No Known Allergies    Intolerances    	    PAST MEDICAL & SURGICAL HISTORY:  Stented coronary artery  HTN (hypertension)  DM (diabetes mellitus)  CAD (coronary artery disease)  H/O abdominal hysterectomy  S/P cholecystectomy      FAMILY HISTORY:  No pertinent family history in first degree relatives      SOCIAL HISTORY:  Tobacco: denies  EtOH: denies  Illicit drugs: denies  Lives with    MEDICATIONS:  amLODIPine   Tablet 5 milliGRAM(s) Oral daily  aspirin  chewable 81 milliGRAM(s) Oral daily  ATENolol  Tablet 25 milliGRAM(s) Oral daily  clopidogrel Tablet 75 milliGRAM(s) Oral daily  enoxaparin Injectable 40 milliGRAM(s) SubCutaneous daily  hydrochlorothiazide 25 milliGRAM(s) Oral daily  losartan 100 milliGRAM(s) Oral daily        acetaminophen   Tablet. 650 milliGRAM(s) Oral every 6 hours PRN  oxyCODONE    5 mG/acetaminophen 325 mG 1 Tablet(s) Oral every 6 hours PRN    polyethylene glycol 3350 17 Gram(s) Oral <User Schedule>    atorvastatin 40 milliGRAM(s) Oral at bedtime  dextrose 50% Injectable 12.5 Gram(s) IV Push once  dextrose 50% Injectable 25 Gram(s) IV Push once  dextrose 50% Injectable 25 Gram(s) IV Push once  dextrose Gel 1 Dose(s) Oral once PRN  glucagon  Injectable 1 milliGRAM(s) IntraMuscular once PRN  insulin lispro (HumaLOG) corrective regimen sliding scale   SubCutaneous three times a day before meals  insulin lispro (HumaLOG) corrective regimen sliding scale   SubCutaneous at bedtime    dextrose 5%. 1000 milliLiter(s) IV Continuous <Continuous>      PHYSICAL EXAM:  T(C): 36.8 (10-11-17 @ 05:50), Max: 36.8 (10-11-17 @ 05:50)  HR: 59 (10-11-17 @ 05:50) (53 - 63)  BP: 133/66 (10-11-17 @ 05:50) (124/62 - 171/96)  RR: 18 (10-11-17 @ 05:50) (15 - 18)  SpO2: 98% (10-11-17 @ 05:50) (98% - 100%)  Wt(kg): --  Daily Height in cm: 157.48 (11 Oct 2017 01:16)    Daily   I&O's Summary      TELEMETRY:     Daily Height in cm: 157.48 (11 Oct 2017 01:16)    Daily    Appearance: NAD, well-developed	  HEENT:   Normal oral mucosa, PERRL, EOMI	  Neck: No JVD, no LAD, supple, trachea in midline  Cardiovascular: Normal S1 S2, No JVD, No murmurs  Respiratory: Lungs clear to auscultation, no wheezing, rhonchi  Gastrointestinal:  Soft, Non-tender, nondistended, + BS  Skin: No rashes, No ecchymoses, No cyanosis	  MSK/Extremities: Normal range of motion, 5/5 Muscle strength bilaterally. No clubbing, cyanosis or edema  Vascular: Peripheral pulses palpable 2+ bilaterally  Neurologic: Non-focal, CN II-XII intact  Psychiatry: A & O x 3, Mood & affect appropriate    LABS:	 	                        10.6   7.70  )-----------( 395      ( 10 Oct 2017 14:20 )             33.3     10-10    137  |  94<L>  |  14  ----------------------------<  98  4.4   |  26  |  0.63    Ca    9.4      10 Oct 2017 14:20    TPro  8.7<H>  /  Alb  3.8  /  TBili  0.3  /  DBili  x   /  AST  38<H>  /  ALT  23  /  AlkPhos  84  10-10      proBNP:   Lipid Profile:   HgA1c:   TSH:   FS: CAPILLARY BLOOD GLUCOSE  136 (10 Oct 2017 23:30)        BCX/UCX:     CARDIAC MARKERS:       COAGS:    	    ECG:  	  RADIOLOGY:    Imaging Personally Reviewed:  [ ] YES  [ ] NO  Consultant(s) Notes Reviewed:  [X] YES  [ ] NO  Care Discussed with Consultants/Other Providers [ ] YES  [ ] NO [facility] Medicine [facility] Progress Note- PGY1.    ===============================================================  CONTACT INFO   Luis F Adler M.D., PGY-1  Pager: NS- [phone], [facility]- [phone]  Spectra: [phone]    Mon-Fri: pager covered by day team 7am-7pm;   ***Academic conferences M-F 12pm-1pm- page ONLY if URGENT or if Consultant  Sat/Kendall Cross Coverage 12pm-7pm: NS- page [phone] for Team1-4, [facility]- pager forwarded to covering Resident  For Night coverage 7pm-7am:  [phone](NS)/[phone]([facility]) Team1-3, 1446 (NS)/[phone] ([facility]) Team4 & Care Model,  ===============================================================  CC: Patient is a 62y old  Female who presents with a chief complaint of R groin pain (11 Oct 2017 00:56)      HPI:  63 y/o F PMHx DM2, HTN, CAD s/p stents x3 (last 2016 January), presents with R groin pain and R leg swelling x1 week.   Patient reports waking up a week ago and having R groin pain with ambulation as well as stiffness in her R leg  She can't describe the pain, but says its severe and is 10/10 when she puts weight on it. It also hurts when she moves the leg when lying down, but doesn't have pain if she doesn't move it.   She reports noticing her R leg has been swollen for few weeks. About a week ago, She reports the leg pain which started in her medial groin area and radiates down to the back of her R knee. Occasionally she also has stiffness in her R knee or sometimes it radiates to the lateral part of her thigh as well.   She also has a "calcification" on her R achilles tendon that causes her pain, but she has had that for months and she is scheduled for surgery on 11/10 to have it removed. She had the same surgery in her L heel earlier this year and has no pain in that heel anymore.   Patient initially presented to the ED the day the pain began last week and US showed no DVT and XR showed some arthritis in the R hip, but was otherwise unremarkable She was sent home and instructed to take Motrin for the pain. The pain has worsened and the motrin and tylenol have not helped with her pain.   Denies any F/C, N/V, abdominal pain, back pain, dysuria, constipation, diarrhea, trauma or recent falls.   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 After standing for 15 to 20 mins patient reports the pain becomes unbearable and has limited her ability to work.      In ED,   Vitals T 97.9, HR 58, /79, RR 18, SpO2 100 on RA   Gave 1L NS, ibuprofen 600mg, percocet x1 (11 Oct 2017 00:56)      Seen and examine patient at bedside. Patient reports the pain has improved slightly. Denied fever, chill, n/v/d, CP, SOB, or abdominal pain.       Allergies    No Known Allergies    Intolerances    	    PAST MEDICAL & SURGICAL HISTORY:  Stented coronary artery  HTN (hypertension)  DM (diabetes mellitus)  CAD (coronary artery disease)  H/O abdominal hysterectomy  S/P cholecystectomy      FAMILY HISTORY:  No pertinent family history in first degree relatives      SOCIAL HISTORY:  Tobacco: denies  EtOH: denies  Illicit drugs: denies  Lives with    MEDICATIONS:  amLODIPine   Tablet 5 milliGRAM(s) Oral daily  aspirin  chewable 81 milliGRAM(s) Oral daily  ATENolol  Tablet 25 milliGRAM(s) Oral daily  clopidogrel Tablet 75 milliGRAM(s) Oral daily  enoxaparin Injectable 40 milliGRAM(s) SubCutaneous daily  hydrochlorothiazide 25 milliGRAM(s) Oral daily  losartan 100 milliGRAM(s) Oral daily        acetaminophen   Tablet. 650 milliGRAM(s) Oral every 6 hours PRN  oxyCODONE    5 mG/acetaminophen 325 mG 1 Tablet(s) Oral every 6 hours PRN    polyethylene glycol 3350 17 Gram(s) Oral <User Schedule>    atorvastatin 40 milliGRAM(s) Oral at bedtime  dextrose 50% Injectable 12.5 Gram(s) IV Push once  dextrose 50% Injectable 25 Gram(s) IV Push once  dextrose 50% Injectable 25 Gram(s) IV Push once  dextrose Gel 1 Dose(s) Oral once PRN  glucagon  Injectable 1 milliGRAM(s) IntraMuscular once PRN  insulin lispro (HumaLOG) corrective regimen sliding scale   SubCutaneous three times a day before meals  insulin lispro (HumaLOG) corrective regimen sliding scale   SubCutaneous at bedtime    dextrose 5%. 1000 milliLiter(s) IV Continuous <Continuous>      PHYSICAL EXAM:  T(C): 36.8 (10-11-17 @ 05:50), Max: 36.8 (10-11-17 @ 05:50)  HR: 59 (10-11-17 @ 05:50) (53 - 63)  BP: 133/66 (10-11-17 @ 05:50) (124/62 - 171/96)  RR: 18 (10-11-17 @ 05:50) (15 - 18)  SpO2: 98% (10-11-17 @ 05:50) (98% - 100%)  Wt(kg): --  Daily Height in cm: 157.48 (11 Oct 2017 01:16)    Daily   I&O's Summary      TELEMETRY:     Daily Height in cm: 157.48 (11 Oct 2017 01:16)    Daily    Appearance: NAD, well-developed	  HEENT:   Normal oral mucosa, PERRL, EOMI	  Neck: No JVD, no LAD, supple, trachea in midline  Cardiovascular: Normal S1 S2, No JVD, No murmurs  Respiratory: Lungs clear to auscultation, no wheezing, rhonchi  Gastrointestinal:  Soft, Non-tender, nondistended, + BS  Skin: No rashes, No ecchymoses, No cyanosis	  MSK/Extremities: Normal range of motion, 5/5 Muscle strength bilaterally. No clubbing, cyanosis or edema  Vascular: Peripheral pulses palpable 2+ bilaterally  Neurologic: Non-focal, CN II-XII intact  Psychiatry: A & O x 3, Mood & affect appropriate    LABS:	 	                        10.6   7.70  )-----------( 395      ( 10 Oct 2017 14:20 )             33.3     10-10    137  |  94<L>  |  14  ----------------------------<  98  4.4   |  26  |  0.63    Ca    9.4      10 Oct 2017 14:20    TPro  8.7<H>  /  Alb  3.8  /  TBili  0.3  /  DBili  x   /  AST  38<H>  /  ALT  23  /  AlkPhos  84  10-10      proBNP:   Lipid Profile:   HgA1c:   TSH:   FS: CAPILLARY BLOOD GLUCOSE  136 (10 Oct 2017 23:30)        BCX/UCX:     CARDIAC MARKERS:       COAGS:    	    ECG:  	  RADIOLOGY:    Imaging Personally Reviewed:  [ ] YES  [ ] NO  Consultant(s) Notes Reviewed:  [X] YES  [ ] NO  Care Discussed with Consultants/Other Providers [ ] YES  [ ] NO [facility] Medicine [facility] Progress Note- PGY1.    ===============================================================  CONTACT INFO   Luis F Adler M.D., PGY-1  Pager: NS- [phone], [facility]- [phone]  Spectra: [phone]    Mon-Fri: pager covered by day team 7am-7pm;   ***Academic conferences M-F 12pm-1pm- page ONLY if URGENT or if Consultant  Sat/Kendall Cross Coverage 12pm-7pm: NS- page [phone] for Team1-4, [facility]- pager forwarded to covering Resident  For Night coverage 7pm-7am:  [phone](NS)/[phone]([facility]) Team1-3, [phone] (NS)/[phone] ([facility]) Team4 & Care Model,  ===============================================================  CC: Patient is a 62y old  Female who presents with a chief complaint of R groin pain (11 Oct 2017 00:56)      HPI:  61 y/o F PMHx DM2, HTN, CAD s/p stents x3 (last 2016 January), presents with R groin pain and R leg swelling x1 week.   Patient reports waking up a week ago and having R groin pain with ambulation as well as stiffness in her R leg  She can't describe the pain, but says its severe and is 10/10 when she puts weight on it. It also hurts when she moves the leg when lying down, but doesn't have pain if she doesn't move it.   She reports noticing her R leg has been swollen for few weeks. About a week ago, She reports the leg pain which started in her medial groin area and radiates down to the back of her R knee. Occasionally she also has stiffness in her R knee or sometimes it radiates to the lateral part of her thigh as well.   She also has a "calcification" on her R achilles tendon that causes her pain, but she has had that for months and she is scheduled for surgery on 11/10 to have it removed. She had the same surgery in her L heel earlier this year and has no pain in that heel anymore.   Patient initially presented to the ED the day the pain began last week and US showed no DVT and XR showed some arthritis in the R hip, but was otherwise unremarkable She was sent home and instructed to take Motrin for the pain. The pain has worsened and the motrin and tylenol have not helped with her pain.   Denies any F/C, N/V, abdominal pain, back pain, dysuria, constipation, diarrhea, trauma or recent falls.   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 After standing for 15 to 20 mins patient reports the pain becomes unbearable and has limited her ability to work.      In ED,   Vitals T 97.9, HR 58, /79, RR 18, SpO2 100 on RA   Gave 1L NS, ibuprofen 600mg, percocet x1 (11 Oct 2017 00:56)      Seen and examine patient at bedside. Patient reports the pain has improved slightly. She denied trauma or injury to the right hip. Currently, she describe the right thigh pain as "someone punched her", 3/10, and nonradiating. She denied fever, chill, n/v/d, CP, SOB, or abdominal pain.       Allergies  No Known Allergies  Intolerances      PAST MEDICAL & SURGICAL HISTORY:  Stented coronary artery  HTN (hypertension)  DM (diabetes mellitus)  CAD (coronary artery disease)  H/O abdominal hysterectomy  S/P cholecystectomy      FAMILY HISTORY:  No pertinent family history in first degree relatives      SOCIAL HISTORY:  Tobacco: denies  EtOH: denies  Illicit drugs: denies    MEDICATIONS:  amLODIPine   Tablet 5 milliGRAM(s) Oral daily  aspirin  chewable 81 milliGRAM(s) Oral daily  ATENolol  Tablet 25 milliGRAM(s) Oral daily  clopidogrel Tablet 75 milliGRAM(s) Oral daily  enoxaparin Injectable 40 milliGRAM(s) SubCutaneous daily  hydrochlorothiazide 25 milliGRAM(s) Oral daily  losartan 100 milliGRAM(s) Oral daily  acetaminophen   Tablet. 650 milliGRAM(s) Oral every 6 hours PRN  oxyCODONE    5 mG/acetaminophen 325 mG 1 Tablet(s) Oral every 6 hours PRN  polyethylene glycol 3350 17 Gram(s) Oral <User Schedule>  atorvastatin 40 milliGRAM(s) Oral at bedtime  dextrose 50% Injectable 12.5 Gram(s) IV Push once  dextrose 50% Injectable 25 Gram(s) IV Push once  dextrose 50% Injectable 25 Gram(s) IV Push once  dextrose Gel 1 Dose(s) Oral once PRN  glucagon  Injectable 1 milliGRAM(s) IntraMuscular once PRN  insulin lispro (HumaLOG) corrective regimen sliding scale   SubCutaneous three times a day before meals  insulin lispro (HumaLOG) corrective regimen sliding scale   SubCutaneous at bedtime  dextrose 5%. 1000 milliLiter(s) IV Continuous <Continuous>      PHYSICAL EXAM:  T(C): 36.8 (10-11-17 @ 05:50), Max: 36.8 (10-11-17 @ 05:50)  HR: 59 (10-11-17 @ 05:50) (53 - 63)  BP: 133/66 (10-11-17 @ 05:50) (124/62 - 171/96)  RR: 18 (10-11-17 @ 05:50) (15 - 18)  SpO2: 98% (10-11-17 @ 05:50) (98% - 100%)  Wt(kg): --  	  GENERAL: Comfortable, no acute distress   HEAD:  Normocephalic, atraumatic  EYES: EOMI, PERRLA  HEENT: Moist mucous membranes  NECK: Supple, No JVD  NERVOUS SYSTEM:  Alert & Oriented X3, Motor Strength 5/5 B/L upper and lower extremities, normal sensation on b/l LEs   CHEST/LUNG: Clear to auscultation bilaterally  HEART: Regular rate and rhythm, no murmur   ABDOMEN: Soft, Nontender, Nondistended, Bowel sounds present  EXTREMITIES:   No clubbing, cyanosis, or edema. has full passive ROM of RLE, but has increased muscle tension pain with movement of R leg compared to L, neg straight leg raise, tenderness to palpation on R medial upper thigh/groin area  MUSCULOSKELETAL: No muscle tenderness, no joint tenderness  SKIN:  warm and dry, no rash    LABS:	 	                        10.6   7.70  )-----------( 395      ( 10 Oct 2017 14:20 )             33.3     10-10    137  |  94<L>  |  14  ----------------------------<  98  4.4   |  26  |  0.63    Ca    9.4      10 Oct 2017 14:20    TPro  8.7<H>  /  Alb  3.8  /  TBili  0.3  /  DBili  x   /  AST  38<H>  /  ALT  23  /  AlkPhos  84  10-10      proBNP:   Lipid Profile:   HgA1c:   TSH:   FS: CAPILLARY BLOOD GLUCOSE  136 (10 Oct 2017 23:30)      < from: CT Abdomen and Pelvis w/ IV Cont (10.10.17 @ 18:42) >  IMPRESSION:     No acute intra-abdominal or pelvic pathology.    < end of copied text >        Imaging Personally Reviewed:  [ ] YES  [ ] NO  Consultant(s) Notes Reviewed:  [X] YES  [ ] NO  Care Discussed with Consultants/Other Providers [ ] YES  [ ] NO

## 2017-10-11 NOTE — H&P ADULT - PROBLEM SELECTOR PLAN 2
- c/w ASA, plavix, and statin   - no cardiac complaints currently  - no signs of ACS on admission EKG

## 2017-10-11 NOTE — H&P ADULT - NSHPPHYSICALEXAM_GEN_ALL_CORE
Vital Signs Last 24 Hrs  T(C): 36.4 (11 Oct 2017 01:16), Max: 36.6 (10 Oct 2017 12:20)  T(F): 97.6 (11 Oct 2017 01:16), Max: 97.9 (10 Oct 2017 12:20)  HR: 53 (11 Oct 2017 01:16) (53 - 63)  BP: 151/78 (11 Oct 2017 01:16) (124/62 - 171/96)  BP(mean): --  RR: 17 (11 Oct 2017 01:16) (15 - 18)  SpO2: 98% (11 Oct 2017 01:16) (98% - 100%)    PHYSICAL EXAM:    GENERAL: Comfortable, no acute distress   HEAD:  Normocephalic, atraumatic  EYES: EOMI, PERRLA  HEENT: Moist mucous membranes  NECK: Supple, No JVD  NERVOUS SYSTEM:  Alert & Oriented X3, Motor Strength 5/5 B/L upper and lower extremities, normal sensation on b/l LEs   CHEST/LUNG: Clear to auscultation bilaterally  HEART: Regular rate and rhythm, no murmur   ABDOMEN: Soft, Nontender, Nondistended, Bowel sounds present  EXTREMITIES:   No clubbing, cyanosis, or edema. patient is obese but no significant unilateral swelling was seen in her upper thigh, has full passive ROM of RLE, but has increased muscle tension pain with movement of R leg compared to L, neg straight leg raise, tenderness to palpation on R medial upper thigh/groin area, also tender in R popliteal region   MUSCULOSKELETAL: No muscle tenderness, no joint tenderness  SKIN:  warm and dry, no rash

## 2017-10-11 NOTE — H&P ADULT - NSHPLABSRESULTS_GEN_ALL_CORE
10.6   7.70  )-----------( 395      ( 10 Oct 2017 14:20 )             33.3       10-10    137  |  94<L>  |  14  ----------------------------<  98  4.4   |  26  |  0.63    Ca    9.4      10 Oct 2017 14:20    TPro  8.7<H>  /  Alb  3.8  /  TBili  0.3  /  DBili  x   /  AST  38<H>  /  ALT  23  /  AlkPhos  84  10-10          Urinalysis Basic - ( 10 Oct 2017 14:20 )    Color: COLORL / Appearance: CLEAR / S.008 / pH: 7.0  Gluc: NEGATIVE / Ketone: NEGATIVE  / Bili: NEGATIVE / Urobili: NORMAL E.U.   Blood: NEGATIVE / Protein: NEGATIVE / Nitrite: NEGATIVE   Leuk Esterase: NEGATIVE / RBC: 0-2 / WBC 0-2   Sq Epi: OCC / Non Sq Epi: x / Bacteria: FEW        Lactate Trend  Blood Gas Venous - Lactate: 0.9: Please note updated reference range. mmol/L (10.10.17 @ 14:20)      CAPILLARY BLOOD GLUCOSE  136 (10 Oct 2017 23:30)        < from: US Duplex Venous Lower Ext Ltd, Right (10.03.17 @ 23:16) >    IMPRESSION:     No evidence of right lower extremity deep venous thrombosis. Note that   the peroneal veins are not visualized.    < end of copied text >    < from: CT Abdomen and Pelvis w/ IV Cont (10.10.17 @ 18:42) >    IMPRESSION:     No acute intra-abdominal or pelvic pathology.    < end of copied text >    < from: Xray Knee 4 Views, Right (10.03.17 @ 21:28) >    IMPRESSION:     Mild, developing osteoarthritis.    < end of copied text >

## 2017-10-11 NOTE — DISCHARGE NOTE ADULT - PROVIDER TOKENS
FREE:[LAST:[Luisa],FIRST:[Luke],PHONE:[phone]],FAX:[(   )    -],ADDRESS:[[address] [place] [address]]]

## 2017-10-11 NOTE — DISCHARGE NOTE ADULT - PATIENT PORTAL LINK FT
“You can access the Follow[employer] Patient Portal, offered by [facility], by registering with the following website: [email or web address]”

## 2017-10-11 NOTE — PATIENT PROFILE ADULT. - NS TRANSFER PATIENT BELONGINGS
Jewelry/Money (specify)/2 gold color ring, 1 silver color ring 1 pair gold color earring/Cell Phone/PDA (specify)

## 2017-10-11 NOTE — DISCHARGE NOTE ADULT - PLAN OF CARE
Physical Therapy, pain free You came in with right thigh/hip pain. You had X-ray of the foot which showed osteoarthritis of the hip. You were given pain medication to control your pain. Please continue to take tylenol for the pain. Please go to physical therapy 3-5 times/week for 2 weeks. Please use walker to ambulate to prevent fall. Please follow up with your primary care physician in 1-2 weeks. HA1c < 6.5%, glucose control,  prevention of complications Please continue to take your home medication. Please monitor your glucose level 3 times/day before meal. Please restrict sugar/cholesterol intake in your diet. Please exercise 3-5 times/week for 30 minutes each time. Please follow up with your primary care physician in 1-2 weeks Blood pressure < 150/90 Please continue to take your home medications. Please restrict salt intake in your diet and follow up with your primary care physician in 1- 2 weeks.

## 2017-10-11 NOTE — PHYSICAL THERAPY INITIAL EVALUATION ADULT - ADDITIONAL COMMENTS
Pt. was recently given a straight cane, but feels it is cumbersome and typically doesn't use it.      Pt. left seated at edge of bed post-PT in NAD with all lines/tubes intact & table/call bell within reach.  RN Sandeep Menchaca aware.

## 2017-10-11 NOTE — H&P ADULT - PROBLEM SELECTOR PLAN 6
- Diet- DASH/TLC with consistent carbs - Diet- DASH/TLC with consistent carbs  - will check TSH, acute hepatitis panel, iron studies, folate and B12

## 2017-10-11 NOTE — PHYSICAL THERAPY INITIAL EVALUATION ADULT - CRITERIA FOR SKILLED THERAPEUTIC INTERVENTIONS
anticipated equipment needs at discharge/anticipated discharge recommendation/Outpatient PT and a rolling walker/therapy frequency/impairments found/predicted duration of therapy intervention

## 2017-10-11 NOTE — DISCHARGE NOTE ADULT - MEDICATION SUMMARY - MEDICATIONS TO TAKE
I will START or STAY ON the medications listed below when I get home from the hospital:    rolling walker  -- Indication: For Right thigh pain    Outpatient Physical Therapy  -- 3-5 times/week for 2 weeks  -- Indication: For Right thigh pain    aspirin 81 mg oral tablet  -- 1 tab(s) by mouth once a day  -- Indication: For Coronary Artery Disease    Advil  -- Indication: For Right thigh pain    Tylenol  -- Indication: For Right thigh pain    metFORMIN 500 mg oral tablet  -- 1 tab(s) by mouth 2 times a day  -- Indication: For Diabetes    glimepiride 4 mg oral tablet  -- 1 tab(s) by mouth once a day  -- Indication: For Diabetes    rosuvastatin 10 mg oral tablet  -- 1 tab(s) by mouth once a day (at bedtime)  -- Indication: For Hyperlipidemia    losartan-hydrochlorothiazide 100mg-25mg oral tablet  -- 1 tab(s) by mouth once a day  -- Indication: For Essential hypertension    clopidogrel 75 mg oral tablet  -- 1 tab(s) by mouth once a day  -- Indication: For Coronary Artery Disease    atenolol 25 mg oral tablet  -- 1 tab(s) by mouth once a day  -- Indication: For Essential hypertension    amLODIPine 5 mg oral tablet  -- 1 tab(s) by mouth once a day  -- Indication: For Essential hypertension    MiraLax oral powder for reconstitution  -- 1 dose(s) by mouth once a day  -- Indication: For Constipation I will START or STAY ON the medications listed below when I get home from the hospital:    rolling walker  -- Indication: For Right thigh pain    Outpatient Physical Therapy  -- 3-5 times/week for 2 weeks  -- Indication: For Right thigh pain    acetaminophen 650 mg oral tablet  -- 1 tab(s) by mouth every 6 hours  -- Indication: For Right thigh pain    aspirin 81 mg oral tablet  -- 1 tab(s) by mouth once a day  -- Indication: For Coronary Artery Disease    metFORMIN 500 mg oral tablet  -- 1 tab(s) by mouth 2 times a day  -- Indication: For Diabetes    glimepiride 4 mg oral tablet  -- 1 tab(s) by mouth once a day  -- Indication: For Diabetes    rosuvastatin 10 mg oral tablet  -- 1 tab(s) by mouth once a day (at bedtime)  -- Indication: For Hyperlipidemia    losartan-hydrochlorothiazide 100mg-25mg oral tablet  -- 1 tab(s) by mouth once a day  -- Indication: For Essential hypertension    clopidogrel 75 mg oral tablet  -- 1 tab(s) by mouth once a day  -- Indication: For Coronary Artery Disease    atenolol 25 mg oral tablet  -- 1 tab(s) by mouth once a day  -- Indication: For Essential hypertension    amLODIPine 5 mg oral tablet  -- 1 tab(s) by mouth once a day  -- Indication: For Essential hypertension    MiraLax oral powder for reconstitution  -- 1 dose(s) by mouth once a day  -- Indication: For Constipation

## 2017-10-11 NOTE — PATIENT PROFILE ADULT. - LIVES WITH, PROFILE
other relative/spouse/(daughter, grandson), private home, 5 steps-to-enter/exit home with unilateral handrail/children

## 2017-10-11 NOTE — H&P ADULT - PROBLEM SELECTOR PLAN 1
- unclear cause of R leg/hip pain   - no evidence of fracture or thrombus on imaging   - patient may have synovitis from bearing weight on the leg improperly due to her chronic achilles tendon pain and ambulating a lot during the day  - can consider CT of F hip vs imaging of her R knee for possible referred pain   - pain control - unclear cause of R leg/hip pain   - no evidence of fracture or thrombus on imaging   - patient may have synovitis from bearing weight on the leg improperly due to her chronic achilles tendon pain and ambulating a lot during the day  - can consider CT of F hip vs imaging of her R knee for possible referred pain   - pain control  - PT eval - unclear cause of R leg/hip pain   - no evidence of fracture or thrombus on imaging   - patient may have synovitis from bearing weight on the leg improperly due to her chronic achilles tendon pain and ambulating a lot during the day  - can consider further imaging of R hip and/or R knee for possible referred pain   - pain control  - PT eval  - fall precautions

## 2017-10-12 VITALS
TEMPERATURE: 98 F | HEART RATE: 62 BPM | RESPIRATION RATE: 18 BRPM | DIASTOLIC BLOOD PRESSURE: 63 MMHG | OXYGEN SATURATION: 99 % | SYSTOLIC BLOOD PRESSURE: 144 MMHG

## 2017-10-12 LAB
BUN SERPL-MCNC: 18 MG/DL — SIGNIFICANT CHANGE UP (ref 7–23)
CALCIUM SERPL-MCNC: 9.6 MG/DL — SIGNIFICANT CHANGE UP (ref 8.4–10.5)
CHLORIDE SERPL-SCNC: 92 MMOL/L — LOW (ref 98–107)
CK SERPL-CCNC: 116 U/L — SIGNIFICANT CHANGE UP (ref 25–170)
CO2 SERPL-SCNC: 26 MMOL/L — SIGNIFICANT CHANGE UP (ref 22–31)
CREAT SERPL-MCNC: 0.77 MG/DL — SIGNIFICANT CHANGE UP (ref 0.5–1.3)
GLUCOSE SERPL-MCNC: 142 MG/DL — HIGH (ref 70–99)
HCT VFR BLD CALC: 34.9 % — SIGNIFICANT CHANGE UP (ref 34.5–45)
HGB BLD-MCNC: 11.4 G/DL — LOW (ref 11.5–15.5)
MAGNESIUM SERPL-MCNC: 1.9 MG/DL — SIGNIFICANT CHANGE UP (ref 1.6–2.6)
MCHC RBC-ENTMCNC: 26.1 PG — LOW (ref 27–34)
MCHC RBC-ENTMCNC: 32.7 % — SIGNIFICANT CHANGE UP (ref 32–36)
MCV RBC AUTO: 80 FL — SIGNIFICANT CHANGE UP (ref 80–100)
NRBC # FLD: 0 — SIGNIFICANT CHANGE UP
PHOSPHATE SERPL-MCNC: 4.1 MG/DL — SIGNIFICANT CHANGE UP (ref 2.5–4.5)
PLATELET # BLD AUTO: 407 K/UL — HIGH (ref 150–400)
PMV BLD: 9 FL — SIGNIFICANT CHANGE UP (ref 7–13)
POTASSIUM SERPL-MCNC: 3.1 MMOL/L — LOW (ref 3.5–5.3)
POTASSIUM SERPL-SCNC: 3.1 MMOL/L — LOW (ref 3.5–5.3)
RBC # BLD: 4.36 M/UL — SIGNIFICANT CHANGE UP (ref 3.8–5.2)
RBC # FLD: 13.7 % — SIGNIFICANT CHANGE UP (ref 10.3–14.5)
SODIUM SERPL-SCNC: 134 MMOL/L — LOW (ref 135–145)
WBC # BLD: 10.04 K/UL — SIGNIFICANT CHANGE UP (ref 3.8–10.5)
WBC # FLD AUTO: 10.04 K/UL — SIGNIFICANT CHANGE UP (ref 3.8–10.5)

## 2017-10-12 PROCEDURE — 99239 HOSP IP/OBS DSCHRG MGMT >30: CPT

## 2017-10-12 RX ORDER — POTASSIUM CHLORIDE 20 MEQ
40 PACKET (EA) ORAL ONCE
Qty: 0 | Refills: 0 | Status: COMPLETED | OUTPATIENT
Start: 2017-10-12 | End: 2017-10-12

## 2017-10-12 RX ORDER — ACETAMINOPHEN 500 MG
0 TABLET ORAL
Qty: 0 | Refills: 0 | COMMUNITY

## 2017-10-12 RX ORDER — ACETAMINOPHEN 500 MG
1 TABLET ORAL
Qty: 0 | Refills: 0 | COMMUNITY

## 2017-10-12 RX ORDER — IBUPROFEN 200 MG
0 TABLET ORAL
Qty: 0 | Refills: 0 | COMMUNITY

## 2017-10-12 RX ADMIN — Medication 1: at 13:09

## 2017-10-12 RX ADMIN — ATENOLOL 25 MILLIGRAM(S): 25 TABLET ORAL at 09:05

## 2017-10-12 RX ADMIN — CLOPIDOGREL BISULFATE 75 MILLIGRAM(S): 75 TABLET, FILM COATED ORAL at 12:05

## 2017-10-12 RX ADMIN — Medication 25 MILLIGRAM(S): at 09:08

## 2017-10-12 RX ADMIN — ENOXAPARIN SODIUM 40 MILLIGRAM(S): 100 INJECTION SUBCUTANEOUS at 12:05

## 2017-10-12 RX ADMIN — AMLODIPINE BESYLATE 5 MILLIGRAM(S): 2.5 TABLET ORAL at 09:05

## 2017-10-12 RX ADMIN — Medication 40 MILLIEQUIVALENT(S): at 09:02

## 2017-10-12 RX ADMIN — Medication 81 MILLIGRAM(S): at 12:05

## 2017-10-12 RX ADMIN — LOSARTAN POTASSIUM 100 MILLIGRAM(S): 100 TABLET, FILM COATED ORAL at 09:05

## 2017-10-12 RX ADMIN — Medication 650 MILLIGRAM(S): at 18:58

## 2017-10-12 RX ADMIN — Medication 650 MILLIGRAM(S): at 18:01

## 2017-10-12 RX ADMIN — Medication 1: at 09:07

## 2017-10-12 RX ADMIN — Medication 1: at 18:02

## 2017-10-12 NOTE — PROGRESS NOTE ADULT - ATTENDING COMMENTS
Medically stable for d/c home today w/ outpatient PT   Rolling walker provided
Pt continues to endorse R. groin pain - will obtain R hip xray.  c/w pain control  PT -> outpatient PT

## 2017-10-12 NOTE — PROGRESS NOTE ADULT - PROBLEM SELECTOR PLAN 1
- unclear cause of R leg/hip pain, no evidence of fracture, thrombus or AVN on imaging   - patient may have synovitis from bearing weight on the leg improperly due to her chronic achilles tendon pain and ambulating a lot during the day  - Elevated CRP and ESR   - pain control - tylenol for mild-moderate pain, percocet for severe pain.   - PT eval recs rolling walker and OP PT  - fall precautions
- unclear cause of R leg/hip pain, no evidence of fracture or thrombus on imaging   - patient may have synovitis from bearing weight on the leg improperly due to her chronic achilles tendon pain and ambulating a lot during the day  - Elevated CRP and ESR   - pain control - tylenol for mild-moderate pain, percocet for severe pain.   - PT eval  - fall precautions

## 2017-10-12 NOTE — PROGRESS NOTE ADULT - ASSESSMENT
61 y/o F PMHx DM2, HTN, CAD s/p stents x3 (last 2016 January), presents with R groin pain and R leg swelling x1 week w/o radiologic evidence of fracture or AVN.
61 y/o F PMHx DM2, HTN, CAD s/p stents x3 (last 2016 January), presents with R groin pain and R leg swelling x1 week.

## 2017-10-12 NOTE — PROGRESS NOTE ADULT - PROBLEM SELECTOR PLAN 4
- c/w HCTZ, losartan, amlodipine, and atenolol w/ hold parameters
- c/w HCTZ, losartan, amlodipine, and atenolol w/ hold parameters

## 2017-10-12 NOTE — PROGRESS NOTE ADULT - PROBLEM SELECTOR PLAN 3
- holding oral agents   - FS well controlled.   - C/w ISS.   - HA1c of 7.7
- holding oral agents   - C/w ISS.   - HA1c of 7.7

## 2017-10-12 NOTE — PROGRESS NOTE ADULT - SUBJECTIVE AND OBJECTIVE BOX
[facility] Medicine CMA Progress Note- PGY1.    ===============================================================  CONTACT INFO   Luis F Adler M.D., PGY-1  Pager: NS- [phone], [facility]- [phone]  Spectra: [phone]    Mon-Fri: pager covered by day team 7am-7pm;   ***Academic conferences M-F 12pm-1pm- page ONLY if URGENT or if Consultant  Sat/Kendall Cross Coverage 12pm-7pm: NS- page [phone] for Team1-4, [facility]- pager forwarded to covering Resident  For Night coverage 7pm-7am:  [phone](NS)/[phone]([facility]) Team1-3, [phone] (NS)/[phone] ([facility]) Team4 & Care Model,  ===============================================================    Chief Complaint: Patient is a 62y old  Female who presents with a chief complaint of R groin pain (11 Oct 2017 06:50)        INTERVAL HPI/OVERNIGHT EVENTS:   No acute overnight event. Patient reports feeling_____. Denied fever, chill, nausea, vomiting, headache, CP, SOB, abdominal pain, diarrhea, or constipation.       MEDICATIONS  (STANDING):  amLODIPine   Tablet 5 milliGRAM(s) Oral daily  aspirin  chewable 81 milliGRAM(s) Oral daily  ATENolol  Tablet 25 milliGRAM(s) Oral daily  atorvastatin 40 milliGRAM(s) Oral at bedtime  clopidogrel Tablet 75 milliGRAM(s) Oral daily  dextrose 5%. 1000 milliLiter(s) (50 mL/Hr) IV Continuous <Continuous>  dextrose 50% Injectable 12.5 Gram(s) IV Push once  dextrose 50% Injectable 25 Gram(s) IV Push once  dextrose 50% Injectable 25 Gram(s) IV Push once  enoxaparin Injectable 40 milliGRAM(s) SubCutaneous daily  hydrochlorothiazide 25 milliGRAM(s) Oral daily  insulin lispro (HumaLOG) corrective regimen sliding scale   SubCutaneous three times a day before meals  insulin lispro (HumaLOG) corrective regimen sliding scale   SubCutaneous at bedtime  losartan 100 milliGRAM(s) Oral daily  polyethylene glycol 3350 17 Gram(s) Oral <User Schedule>    MEDICATIONS  (PRN):  acetaminophen   Tablet. 650 milliGRAM(s) Oral every 6 hours PRN mild and moderate pain  dextrose Gel 1 Dose(s) Oral once PRN Blood Glucose LESS THAN 70 milliGRAM(s)/deciliter  glucagon  Injectable 1 milliGRAM(s) IntraMuscular once PRN Glucose LESS THAN 70 milligrams/deciliter  oxyCODONE    5 mG/acetaminophen 325 mG 1 Tablet(s) Oral every 6 hours PRN Severe Pain (7 - 10)      Vital Signs Last 24 Hrs  T(C): 36.8 (12 Oct 2017 06:00), Max: 36.8 (11 Oct 2017 21:27)  T(F): 98.2 (12 Oct 2017 06:00), Max: 98.3 (11 Oct 2017 21:27)  HR: 61 (12 Oct 2017 06:00) (61 - 74)  BP: 136/71 (12 Oct 2017 06:00) (136/71 - 151/63)  BP(mean): --  RR: 18 (12 Oct 2017 06:00) (17 - 18)  SpO2: 98% (12 Oct 2017 06:00) (97% - 98%)  Supplemental O2: [ ] No, on Room Air [ ] Yes,     I&O's Detail    CAPILLARY BLOOD GLUCOSE  204 (11 Oct 2017 17:31)      POCT Blood Glucose.: 146 mg/dL (11 Oct 2017 21:45)  POCT Blood Glucose.: 205 mg/dL (11 Oct 2017 12:00)  POCT Blood Glucose.: 149 mg/dL (11 Oct 2017 08:57)      PHYSICAL EXAM:  Daily     Daily    Appearance: NAD, well-developed	  HEENT:   Normal oral mucosa, PERRL, EOMI	  Neck: No JVD, no LAD, supple, trachea in midline  Cardiovascular: Normal S1 S2, No JVD, No murmurs  Respiratory: Lungs clear to auscultation, no wheezing, rhonchi  Gastrointestinal:  Soft, Non-tender, nondistended, + BS  Skin: No rashes, No ecchymoses, No cyanosis	  MSK/Extremities: Normal range of motion, 5/5 Muscle strength bilaterally. No clubbing, cyanosis or edema  Vascular: Peripheral pulses palpable 2+ bilaterally  Neurologic: Non-focal, CN II-XII intact  Psychiatry: A & O x 3, Mood & affect appropriate    LABS:                        10.6   7.70  )-----------( 395      ( 10 Oct 2017 14:20 )             33.3     10-    139  |  98  |  11  ----------------------------<  108<H>  3.6   |  26  |  0.68    Ca    9.7      11 Oct 2017 05:16  Phos  3.7     10-11  Mg     2.0     10-    TPro  8.7<H>  /  Alb  3.8  /  TBili  0.3  /  DBili  x   /  AST  38<H>  /  ALT  23  /  AlkPhos  84  10-10    LIVER FUNCTIONS - ( 10 Oct 2017 14:20 )  Alb: 3.8 g/dL / Pro: 8.7 g/dL / ALK PHOS: 84 u/L / ALT: 23 u/L / AST: 38 u/L / GGT: x     / T. Bili 0.3 mg/dL / D. Bili x           Urinalysis Basic - ( 10 Oct 2017 14:20 )    Color: COLORL / Appearance: CLEAR / S.008 / pH: 7.0  Gluc: NEGATIVE / Ketone: NEGATIVE  / Bili: NEGATIVE / Urobili: NORMAL E.U.   Blood: NEGATIVE / Protein: NEGATIVE / Nitrite: NEGATIVE   Leuk Esterase: NEGATIVE / RBC: 0-2 / WBC 0-2   Sq Epi: OCC / Non Sq Epi: x / Bacteria: FEW            Urinalysis Basic - ( 10 Oct 2017 14:20 )    Color: COLORL / Appearance: CLEAR / S.008 / pH: 7.0  Gluc: NEGATIVE / Ketone: NEGATIVE  / Bili: NEGATIVE / Urobili: NORMAL E.U.   Blood: NEGATIVE / Protein: NEGATIVE / Nitrite: NEGATIVE   Leuk Esterase: NEGATIVE / RBC: 0-2 / WBC 0-2   Sq Epi: OCC / Non Sq Epi: x / Bacteria: FEW      Microbiology:    RADIOLOGY & ADDITIONAL TESTS:    Xray -   CT -  MRI -     Imaging Personally Reviewed:  [ ] YES  [ ] NO  Consultant(s) Notes Reviewed:  [X] YES  [ ] NO  Care Discussed with Consultants/Other Providers [ ] YES  [ ] NO [facility] Medicine CMA Progress Note- PGY1.    ===============================================================  CONTACT INFO   Luis F Adler M.D., PGY-1  Pager: NS- [phone], [facility]- [phone]  Spectra: [phone]    Mon-Fri: pager covered by day team 7am-7pm;   ***Academic conferences M-F 12pm-1pm- page ONLY if URGENT or if Consultant  Sat/Kendall Cross Coverage 12pm-7pm: NS- page [phone] for Team1-4, [facility]- pager forwarded to covering Resident  For Night coverage 7pm-7am:  [phone](NS)/[phone]([facility]) Team1-3, [phone] (NS)/[phone] ([facility]) Team4 & Care Model,  ===============================================================    Chief Complaint: Patient is a 62y old  Female who presents with a chief complaint of R groin pain (11 Oct 2017 06:50)        INTERVAL HPI/OVERNIGHT EVENTS:   No acute overnight event. Patient reports feeling better with the right hip pain. Patient reports that she recalled fell to the ground on her left side about 3 months ago while she was at her friend's yard but denied any pain during that time. She denied fever, chill, nausea, vomiting, headache, CP, SOB, abdominal pain, diarrhea, or constipation.       MEDICATIONS  (STANDING):  amLODIPine   Tablet 5 milliGRAM(s) Oral daily  aspirin  chewable 81 milliGRAM(s) Oral daily  ATENolol  Tablet 25 milliGRAM(s) Oral daily  atorvastatin 40 milliGRAM(s) Oral at bedtime  clopidogrel Tablet 75 milliGRAM(s) Oral daily  dextrose 5%. 1000 milliLiter(s) (50 mL/Hr) IV Continuous <Continuous>  dextrose 50% Injectable 12.5 Gram(s) IV Push once  dextrose 50% Injectable 25 Gram(s) IV Push once  dextrose 50% Injectable 25 Gram(s) IV Push once  enoxaparin Injectable 40 milliGRAM(s) SubCutaneous daily  hydrochlorothiazide 25 milliGRAM(s) Oral daily  insulin lispro (HumaLOG) corrective regimen sliding scale   SubCutaneous three times a day before meals  insulin lispro (HumaLOG) corrective regimen sliding scale   SubCutaneous at bedtime  losartan 100 milliGRAM(s) Oral daily  polyethylene glycol 3350 17 Gram(s) Oral <User Schedule>    MEDICATIONS  (PRN):  acetaminophen   Tablet. 650 milliGRAM(s) Oral every 6 hours PRN mild and moderate pain  dextrose Gel 1 Dose(s) Oral once PRN Blood Glucose LESS THAN 70 milliGRAM(s)/deciliter  glucagon  Injectable 1 milliGRAM(s) IntraMuscular once PRN Glucose LESS THAN 70 milligrams/deciliter  oxyCODONE    5 mG/acetaminophen 325 mG 1 Tablet(s) Oral every 6 hours PRN Severe Pain (7 - 10)      Vital Signs Last 24 Hrs  T(C): 36.8 (12 Oct 2017 06:00), Max: 36.8 (11 Oct 2017 21:27)  T(F): 98.2 (12 Oct 2017 06:00), Max: 98.3 (11 Oct 2017 21:27)  HR: 61 (12 Oct 2017 06:00) (61 - 74)  BP: 136/71 (12 Oct 2017 06:00) (136/71 - 151/63)  BP(mean): --  RR: 18 (12 Oct 2017 06:00) (17 - 18)  SpO2: 98% (12 Oct 2017 06:00) (97% - 98%)  Supplemental O2: [ ] No, on Room Air [ ] Yes,     I&O's Detail    CAPILLARY BLOOD GLUCOSE  204 (11 Oct 2017 17:31)      POCT Blood Glucose.: 146 mg/dL (11 Oct 2017 21:45)  POCT Blood Glucose.: 205 mg/dL (11 Oct 2017 12:00)  POCT Blood Glucose.: 149 mg/dL (11 Oct 2017 08:57)      PHYSICAL EXAM:  GENERAL: Comfortable, no acute distress   HEAD:  Normocephalic, atraumatic  EYES: EOMI, PERRLA  HEENT: Moist mucous membranes  NECK: Supple, No JVD  NERVOUS SYSTEM:  Alert & Oriented X3, Motor Strength 5/5 B/L upper and lower extremities, normal sensation on b/l LEs   CHEST/LUNG: Clear to auscultation bilaterally  HEART: Regular rate and rhythm, no murmur   ABDOMEN: Soft, Nontender, Nondistended, Bowel sounds present  EXTREMITIES:   No clubbing, cyanosis, or edema. has full passive ROM of RLE, but has increased muscle tension pain with movement of R leg compared to L, neg straight leg raise, tenderness to palpation on R medial upper thigh/groin area without erythema or swelling  MUSCULOSKELETAL: No muscle tenderness, no joint tenderness  SKIN:  warm and dry, no rash    LABS:                        10.6   7.70  )-----------( 395      ( 10 Oct 2017 14:20 )             33.3     10-    139  |  98  |  11  ----------------------------<  108<H>  3.6   |  26  |  0.68    Ca    9.7      11 Oct 2017 05:16  Phos  3.7     10-11  Mg     2.0     10-11    TPro  8.7<H>  /  Alb  3.8  /  TBili  0.3  /  DBili  x   /  AST  38<H>  /  ALT  23  /  AlkPhos  84  10-10    LIVER FUNCTIONS - ( 10 Oct 2017 14:20 )  Alb: 3.8 g/dL / Pro: 8.7 g/dL / ALK PHOS: 84 u/L / ALT: 23 u/L / AST: 38 u/L / GGT: x     / T. Bili 0.3 mg/dL / D. Bili x           Urinalysis Basic - ( 10 Oct 2017 14:20 )    Color: COLORL / Appearance: CLEAR / S.008 / pH: 7.0  Gluc: NEGATIVE / Ketone: NEGATIVE  / Bili: NEGATIVE / Urobili: NORMAL E.U.   Blood: NEGATIVE / Protein: NEGATIVE / Nitrite: NEGATIVE   Leuk Esterase: NEGATIVE / RBC: 0-2 / WBC 0-2   Sq Epi: OCC / Non Sq Epi: x / Bacteria: FEW            Urinalysis Basic - ( 10 Oct 2017 14:20 )    Color: COLORL / Appearance: CLEAR / S.008 / pH: 7.0  Gluc: NEGATIVE / Ketone: NEGATIVE  / Bili: NEGATIVE / Urobili: NORMAL E.U.   Blood: NEGATIVE / Protein: NEGATIVE / Nitrite: NEGATIVE   Leuk Esterase: NEGATIVE / RBC: 0-2 / WBC 0-2   Sq Epi: OCC / Non Sq Epi: x / Bacteria: FEW      < from: Xray Hip 2-3 Views, Right (10.11.17 @ 12:22) >  IMPRESSION:  No fracture or dislocation.    Slightly hypertrophic degenerative rim ossification again noted along   lateral right acetabular articular margin otherwise preserved right hip   joint space. No gross radiographic evidence for AVN.    Stable chronic mild enthesopathic change along greater trochanter margin.    No cortical destruction, periosteal reaction, or lytic or blastic lesions.    Unremarkable remaining imaged pelvic osseous structures.    Faint right femoral vascular calcifications.    < end of copied text >      Imaging Personally Reviewed:  [ ] YES  [ ] NO  Consultant(s) Notes Reviewed:  [X] YES  [ ] NO  Care Discussed with Consultants/Other Providers [ ] YES  [ ] NO

## 2017-10-12 NOTE — PROGRESS NOTE ADULT - PROBLEM SELECTOR PLAN 6
- Diet- DASH/TLC with consistent carbs    Dispo: Likely d/c today with home PT and pain regimen
- Diet- DASH/TLC with consistent carbs

## 2017-10-20 NOTE — DISCHARGE NOTE ADULT - NS TRANSFER PATIENT BELONGINGS
I recommend that she take it once a day and may take it twice a day as needed.  So for the pharmacy, ask them to write it out as twice a day as needed. Clothing

## 2018-03-09 ENCOUNTER — EMERGENCY (EMERGENCY)
Facility: HOSPITAL | Age: 63
LOS: 1 days | Discharge: HOME | End: 2018-03-09
Attending: EMERGENCY MEDICINE | Admitting: EMERGENCY MEDICINE
Payer: COMMERCIAL

## 2018-03-09 VITALS
HEART RATE: 65 BPM | OXYGEN SATURATION: 100 % | DIASTOLIC BLOOD PRESSURE: 65 MMHG | RESPIRATION RATE: 16 BRPM | TEMPERATURE: 98 F | SYSTOLIC BLOOD PRESSURE: 136 MMHG

## 2018-03-09 VITALS
RESPIRATION RATE: 17 BRPM | HEART RATE: 64 BPM | SYSTOLIC BLOOD PRESSURE: 111 MMHG | OXYGEN SATURATION: 98 % | TEMPERATURE: 98 F | DIASTOLIC BLOOD PRESSURE: 53 MMHG

## 2018-03-09 DIAGNOSIS — Z90.49 ACQUIRED ABSENCE OF OTHER SPECIFIED PARTS OF DIGESTIVE TRACT: Chronic | ICD-10-CM

## 2018-03-09 DIAGNOSIS — Z90.710 ACQUIRED ABSENCE OF BOTH CERVIX AND UTERUS: Chronic | ICD-10-CM

## 2018-03-09 PROCEDURE — 99284 EMERGENCY DEPT VISIT MOD MDM: CPT

## 2018-03-09 PROCEDURE — 93971 EXTREMITY STUDY: CPT | Mod: 26,LT

## 2018-03-09 PROCEDURE — 73502 X-RAY EXAM HIP UNI 2-3 VIEWS: CPT | Mod: 26,RT

## 2018-03-09 NOTE — ED PROVIDER NOTE - PROGRESS NOTE DETAILS
TODD Matta: Pt care discussed with JESÚS attending : 61 y/o F pt with PMHx of DM, CAD s/p stent, HTN p/w right thigh pain/swelling x 1 month. Pt has pain worse with walking, does not appear swollen on exam. Will follow up xray and US results, pt refusing pain medication at this time. TODD Matta: Discussed imaging results with patient, she was unhappy that we weren't able to find a direct cause of her intermittent leg swelling, discussed with pt that she should follow up with her PMD and ortho as arthritis can cause joint swelling. Pt agrees to follow up plan and will return if symptoms worsen.

## 2018-03-09 NOTE — ED PROVIDER NOTE - PLAN OF CARE
Follow up with your primary care provider within 48 hours  Follow up with orthopedics if pain persists, referral list provided  Take Tylenol 625mg every 6 hours as needed for pain  Rest, ice and elevate the affected area  Return to the emergency department with any worsening or concerning symptoms. increased pain, skin changes in the area, inability to bear weight, fever or any other concerns.

## 2018-03-09 NOTE — ED PROVIDER NOTE - CARE PLAN
Assessment and plan of treatment:	Follow up with your primary care provider within 48 hours  Follow up with orthopedics if pain persists, referral list provided  Take Tylenol 625mg every 6 hours as needed for pain  Rest, ice and elevate the affected area  Return to the emergency department with any worsening or concerning symptoms. increased pain, skin changes in the area, inability to bear weight, fever or any other concerns. Principal Discharge DX:	Leg pain  Assessment and plan of treatment:	Follow up with your primary care provider within 48 hours  Follow up with orthopedics if pain persists, referral list provided  Take Tylenol 625mg every 6 hours as needed for pain  Rest, ice and elevate the affected area  Return to the emergency department with any worsening or concerning symptoms. increased pain, skin changes in the area, inability to bear weight, fever or any other concerns.

## 2018-03-09 NOTE — ED PROVIDER NOTE - MEDICAL DECISION MAKING DETAILS
61 y/o F pt with right hip pain. No obvious swelling, however pt reports some swelling. Obtain right hip XR and US. Pt declines pain meds. at this time. Reassess.

## 2018-03-09 NOTE — ED PROVIDER NOTE - OBJECTIVE STATEMENT
63 y/o F pt with PMHx of DM, CAD, HTN, and stented coronary artery, and PSHx of Hysterectomy and Cholecystectomy arrives to the ED c/o worsening (walking and standing) Right thigh pain/swelling and difficulty bearing weight for 1 month. Pt reports that she had "right knee issues" in October 2017; notes that she would hear "cracking noises." FHx of mother with MI and father with stroke. Denies CP, SOB, or any other complaints. Daily meds: Clopidogrel, Rosuvastatin, Losartan, Amlodipine, ASA, Glimepiride, and Atenolol. NKDA. PMD: Dr. Luke Moran (at [facility])

## 2019-10-21 ENCOUNTER — EMERGENCY (EMERGENCY)
Facility: HOSPITAL | Age: 64
LOS: 1 days | Discharge: HOME | End: 2019-10-21
Attending: EMERGENCY MEDICINE | Admitting: EMERGENCY MEDICINE
Payer: MEDICAID

## 2019-10-21 VITALS
OXYGEN SATURATION: 100 % | TEMPERATURE: 98 F | HEART RATE: 63 BPM | DIASTOLIC BLOOD PRESSURE: 68 MMHG | SYSTOLIC BLOOD PRESSURE: 154 MMHG | RESPIRATION RATE: 15 BRPM

## 2019-10-21 DIAGNOSIS — Z90.49 ACQUIRED ABSENCE OF OTHER SPECIFIED PARTS OF DIGESTIVE TRACT: Chronic | ICD-10-CM

## 2019-10-21 DIAGNOSIS — Z90.710 ACQUIRED ABSENCE OF BOTH CERVIX AND UTERUS: Chronic | ICD-10-CM

## 2019-10-21 LAB
ALBUMIN SERPL ELPH-MCNC: 4.1 G/DL — SIGNIFICANT CHANGE UP (ref 3.3–5)
ALP SERPL-CCNC: 81 U/L — SIGNIFICANT CHANGE UP (ref 40–120)
ALT FLD-CCNC: 22 U/L — SIGNIFICANT CHANGE UP (ref 4–33)
ANION GAP SERPL CALC-SCNC: 13 MMO/L — SIGNIFICANT CHANGE UP (ref 7–14)
APTT BLD: 39.7 SEC — HIGH (ref 27.5–36.3)
AST SERPL-CCNC: 34 U/L — HIGH (ref 4–32)
BASOPHILS # BLD AUTO: 0.05 K/UL — SIGNIFICANT CHANGE UP (ref 0–0.2)
BASOPHILS NFR BLD AUTO: 0.5 % — SIGNIFICANT CHANGE UP (ref 0–2)
BILIRUB SERPL-MCNC: 0.3 MG/DL — SIGNIFICANT CHANGE UP (ref 0.2–1.2)
BLD GP AB SCN SERPL QL: NEGATIVE — SIGNIFICANT CHANGE UP
BUN SERPL-MCNC: 15 MG/DL — SIGNIFICANT CHANGE UP (ref 7–23)
CALCIUM SERPL-MCNC: 9.9 MG/DL — SIGNIFICANT CHANGE UP (ref 8.4–10.5)
CHLORIDE SERPL-SCNC: 94 MMOL/L — LOW (ref 98–107)
CO2 SERPL-SCNC: 25 MMOL/L — SIGNIFICANT CHANGE UP (ref 22–31)
CREAT SERPL-MCNC: 0.59 MG/DL — SIGNIFICANT CHANGE UP (ref 0.5–1.3)
EOSINOPHIL # BLD AUTO: 0.62 K/UL — HIGH (ref 0–0.5)
EOSINOPHIL NFR BLD AUTO: 5.7 % — SIGNIFICANT CHANGE UP (ref 0–6)
GLUCOSE SERPL-MCNC: 90 MG/DL — SIGNIFICANT CHANGE UP (ref 70–99)
HCT VFR BLD CALC: 34.3 % — LOW (ref 34.5–45)
HGB BLD-MCNC: 11.1 G/DL — LOW (ref 11.5–15.5)
IMM GRANULOCYTES NFR BLD AUTO: 0.3 % — SIGNIFICANT CHANGE UP (ref 0–1.5)
INR BLD: 1 — SIGNIFICANT CHANGE UP (ref 0.88–1.17)
LIDOCAIN IGE QN: 35.1 U/L — SIGNIFICANT CHANGE UP (ref 7–60)
LYMPHOCYTES # BLD AUTO: 38.1 % — SIGNIFICANT CHANGE UP (ref 13–44)
LYMPHOCYTES # BLD AUTO: 4.18 K/UL — HIGH (ref 1–3.3)
MCHC RBC-ENTMCNC: 26.6 PG — LOW (ref 27–34)
MCHC RBC-ENTMCNC: 32.4 % — SIGNIFICANT CHANGE UP (ref 32–36)
MCV RBC AUTO: 82.3 FL — SIGNIFICANT CHANGE UP (ref 80–100)
MONOCYTES # BLD AUTO: 0.89 K/UL — SIGNIFICANT CHANGE UP (ref 0–0.9)
MONOCYTES NFR BLD AUTO: 8.1 % — SIGNIFICANT CHANGE UP (ref 2–14)
NEUTROPHILS # BLD AUTO: 5.19 K/UL — SIGNIFICANT CHANGE UP (ref 1.8–7.4)
NEUTROPHILS NFR BLD AUTO: 47.3 % — SIGNIFICANT CHANGE UP (ref 43–77)
NRBC # FLD: 0 K/UL — SIGNIFICANT CHANGE UP (ref 0–0)
PLATELET # BLD AUTO: 316 K/UL — SIGNIFICANT CHANGE UP (ref 150–400)
PMV BLD: 9.5 FL — SIGNIFICANT CHANGE UP (ref 7–13)
POTASSIUM SERPL-MCNC: 4.6 MMOL/L — SIGNIFICANT CHANGE UP (ref 3.5–5.3)
POTASSIUM SERPL-SCNC: 4.6 MMOL/L — SIGNIFICANT CHANGE UP (ref 3.5–5.3)
PROT SERPL-MCNC: 8.4 G/DL — HIGH (ref 6–8.3)
PROTHROM AB SERPL-ACNC: 11.4 SEC — SIGNIFICANT CHANGE UP (ref 9.8–13.1)
RBC # BLD: 4.17 M/UL — SIGNIFICANT CHANGE UP (ref 3.8–5.2)
RBC # FLD: 12.5 % — SIGNIFICANT CHANGE UP (ref 10.3–14.5)
RH IG SCN BLD-IMP: POSITIVE — SIGNIFICANT CHANGE UP
SODIUM SERPL-SCNC: 132 MMOL/L — LOW (ref 135–145)
WBC # BLD: 10.96 K/UL — HIGH (ref 3.8–10.5)
WBC # FLD AUTO: 10.96 K/UL — HIGH (ref 3.8–10.5)

## 2019-10-21 PROCEDURE — 99284 EMERGENCY DEPT VISIT MOD MDM: CPT

## 2019-10-21 RX ORDER — SODIUM CHLORIDE 9 MG/ML
1000 INJECTION INTRAMUSCULAR; INTRAVENOUS; SUBCUTANEOUS ONCE
Refills: 0 | Status: COMPLETED | OUTPATIENT
Start: 2019-10-21 | End: 2019-10-21

## 2019-10-21 RX ADMIN — SODIUM CHLORIDE 1000 MILLILITER(S): 9 INJECTION INTRAMUSCULAR; INTRAVENOUS; SUBCUTANEOUS at 22:18

## 2019-10-21 NOTE — ED PROVIDER NOTE - NS ED ROS FT
Constitutional: no fevers or chills  HEENT: no visual changes, no sore throat, no rhinorrhea  CV: no cp or palpitations  Resp: no sob or cough  GI: R groin pain, +nausea, no vomiting, no diarrhea, no constipation  : no dysuria, no hematuria  MSK: no myalgais or arthralgias  skin: no rashes  neuro: no HA, no numbness; no weakness, no tingling  ROS statement: all other ROS negative except as per HPI

## 2019-10-21 NOTE — ED ADULT TRIAGE NOTE - CHIEF COMPLAINT QUOTE
Pt c/o R groin pain radiating down her leg for the past 3 days, worsening today with knee pain.  Pt ambulatory to triage with cane.  Denies any urinary symptoms.  PMHx:  HTN, DM

## 2019-10-21 NOTE — ED PROVIDER NOTE - PROGRESS NOTE DETAILS
PA Smartt: CT scan was negative for gross abnormalities. patient was informed of findings and advised to f/u with pcp. return precautions were discussed PA Smartt: CT scan was negative for gross abnormalities. Pt no longer having pain. patient was informed of findings and advised to f/u with pcp. return precautions were discussed

## 2019-10-21 NOTE — ED PROVIDER NOTE - OBJECTIVE STATEMENT
65 yo F PMHx HTN, DM, CAD s/p stents, pshx cholecystectomy, hysterectomy, presents to ED c/o R groin pain x1 week, acutely worsening over the past 2 days. pt also c/o nausea and decreased appetite. denies fever, vomiting, dysuria, urinary frequency/urgency, vaginal bleeding, diarrhea, cp, sob. states she had normal BM this AM. passing flatus.

## 2019-10-21 NOTE — ED ADULT NURSE NOTE - OBJECTIVE STATEMENT
Pt received to intake room 2. Pt comes to ED c/o of right groin pain starting on Friday. Pt states pain has progressively gotten worse despite taking Tylenol at home for pain relief. Minor swelling noted right inner thigh. Pt denies recent travel & smoking. Taking Xarelto, has cardiac stents. Respirations even & unlabored, pt denies chest pain, dyspnea, N/V/D, chills, fever, weakness, dizziness, headache, cough, changes in vision. Pt is A&Ox4, ambulates with a cane. 20 G IV placed to left hand.

## 2019-10-21 NOTE — ED PROVIDER NOTE - CLINICAL SUMMARY MEDICAL DECISION MAKING FREE TEXT BOX
63 yo F PMHx DM, CAD, HTN, presents to ED c/o R groin pain x1 week, worsening over 2 days. Pt c/o nausea but no vomiting. no fever, chills, urinary sx. VSS. AFebrile. Abd exam shows RLQ tenderness, +psoas sign, but otherwise soft. Concern for appendicitis. Will obtain labs, CT a/p, IVF. Pt declined pain medicine. Will follow and reassess. Judit att: 63 yo F PMHx DM, CAD, HTN, presents to ED c/o R groin pain x1 week, worsening over 2 days. Pt c/o nausea but no vomiting. no fever, chills, urinary sx. VSS. AFebrile. Abd exam shows RLQ tenderness, +psoas sign, but otherwise soft. Concern for appendicitis. Will obtain labs, CT a/p, IVF. Pt declined pain medicine. Will follow and reassess.

## 2019-10-21 NOTE — ED PROVIDER NOTE - PHYSICAL EXAMINATION
PHYSICAL EXAM:  GENERAL: non-toxic appearing; in no respiratory distress  HEAD: Atraumatic, Normocephalic;  NECK: No JVD; FROM  EYES: PERRL, EOMs intact b/l w/out deficits  CHEST/LUNG: CTAB no wheezes/rhonchi/rales  HEART: RRR no murmur/gallops/rubs  ABDOMEN: +BS, soft, RLQ tenderness, +psoas sign. Non-distended. No rebound or guarding.  EXTREMITIES: No LE edema, +2 radial pulses b/l  MUSCULOSKELETAL: FROM of all 4 extremities;  NERVOUS SYSTEM:  A&Ox3, No motor deficits or sensory deficits; CNII-XII intact; no focal neurologic deficits  SKIN:  No new rashes PHYSICAL EXAM:  GENERAL: non-toxic appearing; in no respiratory distress  HEAD: Atraumatic, Normocephalic;  NECK: No JVD; FROM  EYES: PERRL, EOMs intact b/l w/out deficits  CHEST/LUNG: CTAB no wheezes/rhonchi/rales  HEART: RRR no murmur/gallops/rubs  ABDOMEN: +BS, soft, RLQ tenderness, +psoas sign. Non-distended. No rebound or guarding. No palpable masses indicating hernias.  EXTREMITIES: No LE edema, +2 radial pulses b/l  MUSCULOSKELETAL: FROM of all 4 extremities;  NERVOUS SYSTEM:  A&Ox3, No motor deficits or sensory deficits; CNII-XII intact; no focal neurologic deficits  SKIN:  No new rashes

## 2019-10-21 NOTE — ED PROVIDER NOTE - PATIENT PORTAL LINK FT
You can access the FollowMyHealth Patient Portal offered by [facility] by registering at the following website: [email or web address]. By joining [facility] [employer]’s FollowMyHealth portal, you will also be able to view your health information using other applications (apps) compatible with our system.

## 2019-10-22 VITALS
DIASTOLIC BLOOD PRESSURE: 59 MMHG | RESPIRATION RATE: 16 BRPM | TEMPERATURE: 98 F | SYSTOLIC BLOOD PRESSURE: 139 MMHG | HEART RATE: 56 BPM | OXYGEN SATURATION: 99 %

## 2019-10-22 LAB
APPEARANCE UR: CLEAR — SIGNIFICANT CHANGE UP
BILIRUB UR-MCNC: NEGATIVE — SIGNIFICANT CHANGE UP
BLOOD UR QL VISUAL: NEGATIVE — SIGNIFICANT CHANGE UP
COLOR SPEC: COLORLESS — SIGNIFICANT CHANGE UP
GLUCOSE UR-MCNC: NEGATIVE — SIGNIFICANT CHANGE UP
KETONES UR-MCNC: NEGATIVE — SIGNIFICANT CHANGE UP
LEUKOCYTE ESTERASE UR-ACNC: NEGATIVE — SIGNIFICANT CHANGE UP
NITRITE UR-MCNC: NEGATIVE — SIGNIFICANT CHANGE UP
PH UR: 7.5 — SIGNIFICANT CHANGE UP (ref 5–8)
PROT UR-MCNC: NEGATIVE — SIGNIFICANT CHANGE UP
SP GR SPEC: 1.01 — SIGNIFICANT CHANGE UP (ref 1–1.04)
UROBILINOGEN FLD QL: NORMAL — SIGNIFICANT CHANGE UP

## 2019-10-22 PROCEDURE — 74177 CT ABD & PELVIS W/CONTRAST: CPT | Mod: 26

## 2019-10-22 RX ORDER — ACETAMINOPHEN 500 MG
1 TABLET ORAL
Qty: 60 | Refills: 0
Start: 2019-10-22

## 2019-10-31 NOTE — PHYSICAL THERAPY INITIAL EVALUATION ADULT - PREDICTED DURATION OF THERAPY (DAYS/WKS), PT EVAL
Lab Results   Component Value Date    HGBA1C 14 8 (H) 10/25/2019   Likely late onset Type 1 DM, but Ab are pending  FBG 200s and PPBG 170-190s, increase Lantus from 25u qam to 28u qam and 20u qHS to 25u qHS  Call with BG readings in 1 week  Microalbumin:creat urine test sent, check FLP with A1c in 3 months  PPSV23, Hep B#1 and Flu shot given today  Refer to ophtho, Endo and medical nutrition  Normal foot exam today
2 weeks

## 2019-11-01 ENCOUNTER — OUTPATIENT (OUTPATIENT)
Dept: OUTPATIENT SERVICES | Facility: HOSPITAL | Age: 64
LOS: 1 days | End: 2019-11-01
Payer: MEDICAID

## 2019-11-01 DIAGNOSIS — Z90.49 ACQUIRED ABSENCE OF OTHER SPECIFIED PARTS OF DIGESTIVE TRACT: Chronic | ICD-10-CM

## 2019-11-01 DIAGNOSIS — Z90.710 ACQUIRED ABSENCE OF BOTH CERVIX AND UTERUS: Chronic | ICD-10-CM

## 2019-11-01 PROCEDURE — G9001: CPT

## 2019-11-11 DIAGNOSIS — Z71.89 OTHER SPECIFIED COUNSELING: ICD-10-CM

## 2019-11-21 PROBLEM — Z00.00 ENCOUNTER FOR PREVENTIVE HEALTH EXAMINATION: Status: ACTIVE | Noted: 2019-11-21

## 2019-11-22 ENCOUNTER — APPOINTMENT (OUTPATIENT)
Dept: ORTHOPEDIC SURGERY | Facility: CLINIC | Age: 64
End: 2019-11-22

## 2020-09-10 NOTE — PHYSICAL THERAPY INITIAL EVALUATION ADULT - PATIENT PROFILE REVIEW, REHAB EVAL
ACTIVITY: ambulate with assistance; consult with Sandeep Menchaca RN --> pt. OK for PT as tolerated/yes detailed exam PERRL

## 2021-09-28 ENCOUNTER — APPOINTMENT (OUTPATIENT)
Dept: ORTHOPEDIC SURGERY | Facility: CLINIC | Age: 66
End: 2021-09-28

## 2021-09-28 NOTE — DISCUSSION/SUMMARY
[de-identified] : The underlying pathophysiology was reviewed in great detail with the patient as well as the various treatment options, including ice, analgesics, NSAIDs, Physical therapy, steroid injections.\par \par \par Activity modifications and restrictions were discussed. I advised avoiding deep bending, squatting and high intensity activity.\par \par A home exercise sheet was given and discussed with the patient to follow. \par \par I have recommended utilizing a knee sleeve to provide added support and stability. \par \par \par \par FU\par \par All questions were answered, all alternatives discussed and the patient is in complete agreement with that plan. Follow-up appointment as instructed. Any issues and the patient will call or come in sooner.

## 2021-09-28 NOTE — PHYSICAL EXAM
[de-identified] : Right Lower Extremity\par o Knee :\par ¦ Inspection/Palpation : no tenderness to palpation, no swelling, no deformity\par ¦ Range of Motion : 0 - 120 degrees, no crepitus\par ¦ Stability : no valgus or varus instability present on provocative testing, Lachman’s Test (-)\par ¦ Strength : hip flexion 5/5, adduction 5/5, gluteus medius 5/5 \par o Muscle Bulk : normal muscle bulk present\par o Skin : no erythema, no ecchymosis\par o Sensation : sensation to pin intact\par o Vascular Exam : no edema, no cyanosis, dorsalis pedis artery pulse 2+, posterior tibial artery pulse 2+\par \par Left Lower Extremity\par o Knee :\par ¦ Inspection/Palpation : no tenderness to palpation, no swelling, no deformity\par ¦ Range of Motion : 0 -120 degrees, no crepitus\par ¦ Stability : no valgus or varus instability present on provocative testing, Lachman’s Test (-)\par ¦ Strength : hip flexion 5/5, adduction 5/5, gluteus medius 5/5 \par o Muscle Bulk : normal muscle bulk present\par o Skin : no erythema, no ecchymosis\par o Sensation : sensation to pin intact\par o Vascular Exam : no edema, no cyanosis, dorsalis pedis artery pulse 2+, posterior tibial artery pulse 2+  [de-identified] : o [Right/Left] Knee : AP, lateral, sunrise, and Parikh views of the knee were obtained, there are no soft tissue abnormalities, no fractures, alignment is normal, normal appearing joint spaces, normal bone density, no bony lesions.\par \par

## 2021-09-28 NOTE — HISTORY OF PRESENT ILLNESS
[de-identified] : HELEN ANGULO is a 66 year female presenting to the office complaining of [] . She  presents to the office ambulating with [] . Patient reports pain for ______. Denies injury or trauma to the area.  The patient describes the pain as a dull aching, and occasionally sharp pain localized to the medial aspect of her   [] that is intermittent in nature. Her   symptoms are exacerbated [].  Pain is alleviated with rest.  Patient [] instability, catching or locking of the knee. \par Patient is taking ___ for pain relief with ___ relief in symptoms. Of note [] \par Patient denies any other complaints at this time.

## 2021-11-11 NOTE — H&P ADULT - NSHPRISKHEPCSCREEN_GEN_A_CORE
Bedside report received from day RN, pt care assumed, assessment completed. Pt is A&O4, pain 0, a fib on the monitor. Updated on POC, questions answered. Bed in lowest, locked position, treaded socks on, call light and belongings within reach. Fall precautions in place.   Offered and patient accepted

## 2022-12-05 NOTE — DISCHARGE NOTE ADULT - DO YOU HAVE DIFFICULTY CLIMBING STAIRS
Patient with ASHLYN on admission BUN 31, Scr 2.35 -->Scr 2.31 (baseline Scr 1.1 10/22).  ASHLYN likely prerenal in setting of hypovolemia due to diarrhea and poor po intake. Given prostate mets, there may also be a post-renal component. Pt w/ mark initially, then removed after passing TOV.  - worsening Cr 12/5  - 1L LR bolus  - trend Cr, avoid nephrotoxic drugs, renally dose meds No

## 2023-08-25 NOTE — ED ADULT NURSE NOTE - CCCP TRG CHIEF CMPLNT
Physical Therapy        Physical Therapy Cancel Note      DATE: 2023    NAME: Benita Mc  MRN: [id number]   : 1940      Patient not seen this date for Physical Therapy due to:    Per RN, acute PT not needed. Plan is d/c back to Hospice.  D/C PT      Electronically signed by Jose Elias Aquino PT on 2023 at 12:49 PM thigh pain

## 2025-07-04 NOTE — ED PROVIDER NOTE - RECEIVING PHYSICIAN
Please call the patient and inform them of the benign results.  If the patient has any further questions, I would be happy to answer them.      Keegan